# Patient Record
Sex: FEMALE | Race: WHITE | NOT HISPANIC OR LATINO | Employment: FULL TIME | URBAN - NONMETROPOLITAN AREA
[De-identification: names, ages, dates, MRNs, and addresses within clinical notes are randomized per-mention and may not be internally consistent; named-entity substitution may affect disease eponyms.]

---

## 2021-06-23 ENCOUNTER — INITIAL PRENATAL (OUTPATIENT)
Dept: OBSTETRICS AND GYNECOLOGY | Facility: CLINIC | Age: 26
End: 2021-06-23

## 2021-06-23 ENCOUNTER — TELEPHONE (OUTPATIENT)
Dept: OBSTETRICS AND GYNECOLOGY | Facility: CLINIC | Age: 26
End: 2021-06-23

## 2021-06-23 ENCOUNTER — LAB (OUTPATIENT)
Dept: LAB | Facility: HOSPITAL | Age: 26
End: 2021-06-23

## 2021-06-23 VITALS
HEIGHT: 66 IN | DIASTOLIC BLOOD PRESSURE: 62 MMHG | BODY MASS INDEX: 26.68 KG/M2 | WEIGHT: 166 LBS | SYSTOLIC BLOOD PRESSURE: 108 MMHG

## 2021-06-23 DIAGNOSIS — O36.80X0 ENCOUNTER TO DETERMINE FETAL VIABILITY OF PREGNANCY, SINGLE OR UNSPECIFIED FETUS: ICD-10-CM

## 2021-06-23 DIAGNOSIS — Z34.00 SUPERVISION OF NORMAL FIRST PREGNANCY, ANTEPARTUM: Primary | ICD-10-CM

## 2021-06-23 DIAGNOSIS — Z32.01 POSITIVE BLOOD PREGNANCY TEST: ICD-10-CM

## 2021-06-23 DIAGNOSIS — F12.90 MARIJUANA SMOKER: ICD-10-CM

## 2021-06-23 DIAGNOSIS — Z36.87 UNSURE OF LMP (LAST MENSTRUAL PERIOD) AS REASON FOR ULTRASOUND SCAN: ICD-10-CM

## 2021-06-23 LAB
ABO GROUP BLD: NORMAL
AMPHET+METHAMPHET UR QL: NEGATIVE
AMPHETAMINES UR QL: NEGATIVE
BARBITURATES UR QL SCN: NEGATIVE
BASOPHILS # BLD AUTO: 0.06 10*3/MM3 (ref 0–0.2)
BASOPHILS NFR BLD AUTO: 0.5 % (ref 0–1.5)
BENZODIAZ UR QL SCN: NEGATIVE
BILIRUB UR QL STRIP: NEGATIVE
BLD GP AB SCN SERPL QL: NEGATIVE
BUPRENORPHINE SERPL-MCNC: NEGATIVE NG/ML
CANNABINOIDS SERPL QL: POSITIVE
CLARITY UR: ABNORMAL
COCAINE UR QL: NEGATIVE
COLOR UR: YELLOW
DEPRECATED RDW RBC AUTO: 40.1 FL (ref 37–54)
EOSINOPHIL # BLD AUTO: 0.13 10*3/MM3 (ref 0–0.4)
EOSINOPHIL NFR BLD AUTO: 1.1 % (ref 0.3–6.2)
ERYTHROCYTE [DISTWIDTH] IN BLOOD BY AUTOMATED COUNT: 12.8 % (ref 12.3–15.4)
GLUCOSE UR STRIP-MCNC: NEGATIVE MG/DL
HBV SURFACE AG SERPL QL IA: NORMAL
HCG INTACT+B SERPL-ACNC: NORMAL MIU/ML
HCT VFR BLD AUTO: 39.4 % (ref 34–46.6)
HCV AB SER DONR QL: NORMAL
HGB BLD-MCNC: 13.5 G/DL (ref 12–15.9)
HGB UR QL STRIP.AUTO: NEGATIVE
HIV1+2 AB SER QL: NORMAL
IMM GRANULOCYTES # BLD AUTO: 0.04 10*3/MM3 (ref 0–0.05)
IMM GRANULOCYTES NFR BLD AUTO: 0.3 % (ref 0–0.5)
KETONES UR QL STRIP: NEGATIVE
LEUKOCYTE ESTERASE UR QL STRIP.AUTO: NEGATIVE
LYMPHOCYTES # BLD AUTO: 3.5 10*3/MM3 (ref 0.7–3.1)
LYMPHOCYTES NFR BLD AUTO: 29 % (ref 19.6–45.3)
Lab: NORMAL
MCH RBC QN AUTO: 29.7 PG (ref 26.6–33)
MCHC RBC AUTO-ENTMCNC: 34.3 G/DL (ref 31.5–35.7)
MCV RBC AUTO: 86.8 FL (ref 79–97)
METHADONE UR QL SCN: NEGATIVE
MONOCYTES # BLD AUTO: 0.71 10*3/MM3 (ref 0.1–0.9)
MONOCYTES NFR BLD AUTO: 5.9 % (ref 5–12)
NEUTROPHILS NFR BLD AUTO: 63.2 % (ref 42.7–76)
NEUTROPHILS NFR BLD AUTO: 7.64 10*3/MM3 (ref 1.7–7)
NITRITE UR QL STRIP: NEGATIVE
NRBC BLD AUTO-RTO: 0 /100 WBC (ref 0–0.2)
OPIATES UR QL: NEGATIVE
OXYCODONE UR QL SCN: NEGATIVE
PCP UR QL SCN: NEGATIVE
PH UR STRIP.AUTO: 7 [PH] (ref 5–8)
PLATELET # BLD AUTO: 323 10*3/MM3 (ref 140–450)
PMV BLD AUTO: 10.2 FL (ref 6–12)
PROPOXYPH UR QL: NEGATIVE
PROT UR QL STRIP: NEGATIVE
RBC # BLD AUTO: 4.54 10*6/MM3 (ref 3.77–5.28)
RH BLD: POSITIVE
SP GR UR STRIP: 1.03 (ref 1–1.03)
TRICYCLICS UR QL SCN: NEGATIVE
UROBILINOGEN UR QL STRIP: ABNORMAL
WBC # BLD AUTO: 12.08 10*3/MM3 (ref 3.4–10.8)

## 2021-06-23 PROCEDURE — 87661 TRICHOMONAS VAGINALIS AMPLIF: CPT | Performed by: NURSE PRACTITIONER

## 2021-06-23 PROCEDURE — 80306 DRUG TEST PRSMV INSTRMNT: CPT | Performed by: NURSE PRACTITIONER

## 2021-06-23 PROCEDURE — 87086 URINE CULTURE/COLONY COUNT: CPT | Performed by: NURSE PRACTITIONER

## 2021-06-23 PROCEDURE — 36415 COLL VENOUS BLD VENIPUNCTURE: CPT

## 2021-06-23 PROCEDURE — 81003 URINALYSIS AUTO W/O SCOPE: CPT | Performed by: NURSE PRACTITIONER

## 2021-06-23 PROCEDURE — 87591 N.GONORRHOEAE DNA AMP PROB: CPT | Performed by: NURSE PRACTITIONER

## 2021-06-23 PROCEDURE — G0480 DRUG TEST DEF 1-7 CLASSES: HCPCS | Performed by: NURSE PRACTITIONER

## 2021-06-23 PROCEDURE — 80081 OBSTETRIC PANEL INC HIV TSTG: CPT | Performed by: NURSE PRACTITIONER

## 2021-06-23 PROCEDURE — 86803 HEPATITIS C AB TEST: CPT | Performed by: NURSE PRACTITIONER

## 2021-06-23 PROCEDURE — 87491 CHLMYD TRACH DNA AMP PROBE: CPT | Performed by: NURSE PRACTITIONER

## 2021-06-23 PROCEDURE — 84702 CHORIONIC GONADOTROPIN TEST: CPT | Performed by: NURSE PRACTITIONER

## 2021-06-23 RX ORDER — DIPHENHYDRAMINE HYDROCHLORIDE 25 MG/1
25 CAPSULE ORAL 2 TIMES DAILY
Qty: 60 TABLET | Refills: 3 | Status: SHIPPED | OUTPATIENT
Start: 2021-06-23 | End: 2021-07-20

## 2021-06-23 RX ORDER — ONDANSETRON 4 MG/1
4 TABLET, FILM COATED ORAL DAILY PRN
Qty: 30 TABLET | Refills: 1 | Status: SHIPPED | OUTPATIENT
Start: 2021-06-23 | End: 2022-06-23

## 2021-06-23 RX ORDER — PROMETHAZINE HYDROCHLORIDE 25 MG/1
TABLET ORAL
COMMUNITY
Start: 2021-06-17 | End: 2021-07-20

## 2021-06-23 NOTE — PROGRESS NOTES
I spent approximately 45 minutes with the patient acquiring the health and history intake and discussing topics related to healthy lifestyle. She is accompanied by her wesley. She had a positive serum HCG at Urgent Care.  Her LMP is approximately 5/10/21. This is her 1st  pregnancy. Her marie' has a daughter.   She has a history of asthma. She also has a history of depression. She says she does not have any problems with it now. She filled out the depression screening questionnaire and scored 2. She quit smoking when she found out she was pregnant. She drank occasionally prior to pregnancy. She does still smoke marijuana. She says it helps with her appetite since she is having nausea. She says she plans to quit smoking. I told her we do recommend that she quit and that she not be around secondhand smoke of any kind.   A newob bag is given today.  The 1st trimester teaching was done with the patient. We discussed a healthy diet and exercise and what is recommended. She plans to  prenatal gummies. I also discussed Listeriosis and Toxoplasmosis and what fish to avoid due to high mercury levels. I informed patient not to be in hot tubs, saunas, or tanning beds. We discussed that spotting may occur after intercourse which is common, but if heavy bleeding like a period occurs to call the [facility] or hospital if clinic is closed.  I encouraged her to make an appointment with the dentist if she has not had a dental exam and cleaning in the last 6 months. She is unsure if she wants  to breastfeed or formula feed.  I gave her pamphlet on breastfeeding classes and the breastfeeding mothers support group. These services are provided by Estephania Alvarez, Lactation Consultant. She filled out the health department referral form. She is interested in WIC. I also gave her a HANDS pamphlet.  I encouraged the patient to get the TDAP vaccine in the 3rd trimester.  I discussed with the patient that a pediatrician needs to be  chosen prior to delivery for the infant to have an appointment scheduled before leaving the hospital.  I discussed lab tests will be done today. She says her last pap smear was in 2019 in [place].  All questions were answered at this time. She is scheduled for an ultrasound and to see Dr. Paris on 7/20/21.

## 2021-06-23 NOTE — TELEPHONE ENCOUNTER
The patient had asked me to call her with her beta HCG quant level. I called patient and gave her the results.

## 2021-06-24 LAB
BACTERIA SPEC AEROBE CULT: NO GROWTH
C TRACH RRNA CVX QL NAA+PROBE: NEGATIVE
N GONORRHOEA RRNA SPEC QL NAA+PROBE: NEGATIVE
RPR SER QL: NORMAL
RUBV IGG SERPL IA-ACNC: 1.18 INDEX
TRICHOMONAS VAGINALIS PCR: NEGATIVE

## 2021-07-09 LAB — CANNABINOIDS UR QL CFM: POSITIVE

## 2021-07-20 ENCOUNTER — INITIAL PRENATAL (OUTPATIENT)
Dept: OBSTETRICS AND GYNECOLOGY | Facility: CLINIC | Age: 26
End: 2021-07-20

## 2021-07-20 VITALS — WEIGHT: 166 LBS | SYSTOLIC BLOOD PRESSURE: 104 MMHG | BODY MASS INDEX: 26.79 KG/M2 | DIASTOLIC BLOOD PRESSURE: 60 MMHG

## 2021-07-20 DIAGNOSIS — Z34.00 SUPERVISION OF NORMAL FIRST PREGNANCY, ANTEPARTUM: ICD-10-CM

## 2021-07-20 DIAGNOSIS — Z3A.10 10 WEEKS GESTATION OF PREGNANCY: Primary | ICD-10-CM

## 2021-07-20 PROCEDURE — 99203 OFFICE O/P NEW LOW 30 MIN: CPT | Performed by: FAMILY MEDICINE

## 2021-07-20 RX ORDER — PRENATAL VIT/IRON FUM/FOLIC AC 27MG-0.8MG
TABLET ORAL DAILY
COMMUNITY

## 2021-07-20 NOTE — PROGRESS NOTES
[facility]  Obstetrics Visit    CHIEF COMPLAINT:  New prenatal visit    HISTORY OF PRESENT ILLNESS:  Jayashree Roberts is a 25 y.o. y/o  at 10w1d by approximate LMP (Patient's last menstrual period was 05/10/2021 (within days).) c/w 1TUS (21 10w5d) This was an unplanned pregnancy and the patient is supported by FOB: Sulaiman. Reports mild nausea controlled with PRN zofran.  She denies any vaginal bleeding. She has started taking a prenatal vitamin.  Undecided about feeding intentions.    PRENATAL RISK FACTORS   Problems (from 21 to present)     No problems associated with this episode.          DATING CRITERIA:  Patient's last menstrual period was 05/10/2021 (within days). -- Estimated Date of Delivery: 22 by LMP c/w 1TUS (21 at 10w5d)    OBSTETRIC HISTORY:  OB History    Para Term  AB Living   1             SAB TAB Ectopic Molar Multiple Live Births                    # Outcome Date GA Lbr Dev/2nd Weight Sex Delivery Anes PTL Lv   1 Current              GYN HISTORY:  Denies h/o sexually transmitted infections/pelvic inflammatory disease  Denies h/o abnormal pap smears, last pap was   Last Completed Pap Smear     This patient has no relevant Health Maintenance data.        Denies h/o gynecologic surgeries, including biopsies of the cervix    PAST MEDICAL HISTORY:  Past Medical History:   Diagnosis Date   • Asthma    • History of depression    • Marijuana smoker    • Substance abuse (CMS/[facility])    • Varicella      PAST SURGICAL HISTORY:  Past Surgical History:   Procedure Laterality Date   • ADENOIDECTOMY     • TONSILLECTOMY       FAMILY HISTORY:  Family History   Problem Relation Age of Onset   • Thyroid disease Father    • Diabetes Mother    • Thyroid disease Sister    • Diabetes Maternal Grandmother    • No Known Problems Paternal Grandmother      SOCIAL HISTORY:  Social History     Socioeconomic History   • Marital status: Single     Spouse name: Not on file   •  Number of children: Not on file   • Years of education: Not on file   • Highest education level: Not on file   Tobacco Use   • Smoking status: Former Smoker   • Smokeless tobacco: Never Used   Substance and Sexual Activity   • Alcohol use: Not Currently   • Drug use: Yes     Types: Marijuana   • Sexual activity: Yes     Partners: Male     Comment: last pap smear 2019 in  [place]     GENETIC SCREENING:  Age >34 yo as of LEONARDO: no  Thalassemia: no  NTD: no  CHD: no  Down Syndrome/MR/Fragile X/Autism: no  [employer] with Ciro-Sachs, [place], familial dysautonomia: no  Sickle cell disease or trait: no  Hemophilia: no  Muscular dystrophy: no  Cystic fibrosis: no  Khadar's chorea: no  Birth defects: no  Genetic/chromosomal disorders: no    INFECTION HISTORY:  TB exposure: no  HSV: no  Illness since LMP: no  Prior GBS infected child: n/a  STIs: no    ALLERGIES:  Allergies   Allergen Reactions   • Azithromycin Rash   • Penicillins Rash     MEDICATIONS:  Prior to Admission medications    Medication Sig Start Date End Date Taking? Authorizing Provider   Prenatal Vit-Fe Fumarate-FA (prenatal vitamin 27-0.8) 27-0.8 MG tablet tablet Take  by mouth Daily.   Yes Jaylyn Rouse MD   ondansetron (Zofran) 4 MG tablet Take 1 tablet by mouth Daily As Needed for Nausea or Vomiting. 6/23/21 6/23/22  Lynette Chand APRN   promethazine (PHENERGAN) 25 MG tablet TAKE 1/2 TO 1 TABLET BY MOUTH EVERY 6 HOURS AS NEEDED FOR NAUSEA 6/17/21 7/20/21  ProviderJaylyn MD   vitamin B-6 (PYRIDOXINE) 25 MG tablet Take 1 tablet by mouth 2 (two) times a day. 6/23/21 7/20/21  Lynette Chand APRN       REVIEW OF SYSTEMS  GEN: no fever or chills  CVS: no chest pain or palpitations  RESP: no cough, no shortness of breath  BREAST: no masses or nipple discharge  GI: + nausea, no vomiting, no diarrhea, constipation or abdominal pain  : no dysuria, no vaginal discharge, no vaginal bleeding  CNS: no dizziness or  lightheadedness  DERM: no rash, no lesions  MUSC: no myalgias, no gait disturbance  Psych: no depression or anxiety    PHYSICAL EXAM:   /60   Wt 75.3 kg (166 lb)   LMP 05/10/2021 (Within Days)   BMI 26.79 kg/m²   General: Alert, healthy, no distress, well nourished and well developed.  Neurologic: Alert, oriented to person, place, and time.  Gait normal.  Cranial nerves II-XII grossly intact.  HEENT: Normocephalic, atraumatic.  Extraocular muscles intact, pupils equal and reactive x2.    Teeth: Normal hygiene.  Neck: Supple, trachea midline.  Lungs: Clear to auscultation bilaterally, normal effort. No wheezes/rhonchi/rales.  Heart: Regular rate and rhythm.  No murmer, rub or gallop.  Abdomen: Soft, non-tender, non-distended  Skin: No rash, no lesions.  Extremities: No cyanosis, clubbing or edema.  PELVIC EXAM:  DEFERRED    IMPRESSION:  Jayashree Roberts is a 25 y.o.  at 10w1d for a new prenatal visit.    PLAN:  1.  IUP at 10w1d  - Prenatal labs reviewed including Type and Screen, CBC, Rubella, RPR, Hepatitis B and C and HIV, Gonorrhea/Chlamydia cultures, UA and urine culture  - Dating/viability ultrasound obtained and reviewed  - Pap smear reviewed  - Continue prenatal vitamins  - Weight gain counseling performed.   - BMI <18.5: Recommend 28-40 lb weight gain   - BMI 18.5-24.9: 25-35 lb   - BMI 25-29.9: 15-25 lb   - BMI >30: 11-20 lb    - Return to clinic in 4 weeks for return prenatal visit    Signature  Shy Paris MD  [facility]  [address] [place] [address]  Office: [phone]    This document has been electronically signed by Shy Paris MD on 2021 14:19 CDT

## 2021-08-17 ENCOUNTER — ROUTINE PRENATAL (OUTPATIENT)
Dept: OBSTETRICS AND GYNECOLOGY | Facility: CLINIC | Age: 26
End: 2021-08-17

## 2021-08-17 VITALS — WEIGHT: 170.6 LBS | DIASTOLIC BLOOD PRESSURE: 58 MMHG | BODY MASS INDEX: 27.54 KG/M2 | SYSTOLIC BLOOD PRESSURE: 112 MMHG

## 2021-08-17 DIAGNOSIS — Z34.00 SUPERVISION OF NORMAL FIRST PREGNANCY, ANTEPARTUM: ICD-10-CM

## 2021-08-17 DIAGNOSIS — Z3A.14 14 WEEKS GESTATION OF PREGNANCY: Primary | ICD-10-CM

## 2021-08-17 DIAGNOSIS — Z36.89 ENCOUNTER FOR FETAL ANATOMIC SURVEY: ICD-10-CM

## 2021-08-17 PROCEDURE — 99212 OFFICE O/P EST SF 10 MIN: CPT | Performed by: FAMILY MEDICINE

## 2021-08-17 NOTE — PROGRESS NOTES
CC: Prenatal visit    Jayashree Roberts is a 26 y.o.  at 14w1d.  Doing well.  No complaints.  Denies contractions, LOF, or VB.  Has not yet felt FM.    /58   Wt 77.4 kg (170 lb 9.6 oz)   LMP 05/10/2021 (Within Days)   BMI 27.54 kg/m²   SVE: deferred     Fetal Heart Rate: 157     Problems (from 21 to present)     Problem Noted Resolved    Supervision of normal first pregnancy, antepartum 2021 by Shy Paris MD No    Overview Signed 2021  2:23 PM by Shy Paris MD     A POS/ Rubella immune/ GBS @ 36wks  Dating: LMP  Tdap: 28wks  Anatomy: 20wks  1h Glucola: 28wks  Lab Results   Component Value Date    HGB 13.5 2021    HCT 39.4 2021     2021     Feeding Method: undecided  BC plan: ?                 A/P: Jayashree Roberts is a 26 y.o.  at 14w1d.  - RTC in 6 weeks with anatomy scan  - cramping precautions given     Diagnosis Plan   1. 14 weeks gestation of pregnancy     2. Supervision of normal first pregnancy, antepartum     3. Encounter for fetal anatomic survey  MFM OB US MAD       Signature  Shy Paris MD  [facility]  [address] [place] [address]  Office: [phone]      This document has been electronically signed by Shy Paris MD on 2021 13:42 CDT

## 2021-09-28 ENCOUNTER — ROUTINE PRENATAL (OUTPATIENT)
Dept: OBSTETRICS AND GYNECOLOGY | Facility: CLINIC | Age: 26
End: 2021-09-28

## 2021-09-28 ENCOUNTER — LAB (OUTPATIENT)
Dept: LAB | Facility: HOSPITAL | Age: 26
End: 2021-09-28

## 2021-09-28 VITALS — DIASTOLIC BLOOD PRESSURE: 70 MMHG | BODY MASS INDEX: 28.57 KG/M2 | WEIGHT: 177 LBS | SYSTOLIC BLOOD PRESSURE: 118 MMHG

## 2021-09-28 DIAGNOSIS — R22.43: ICD-10-CM

## 2021-09-28 DIAGNOSIS — Z3A.20 20 WEEKS GESTATION OF PREGNANCY: ICD-10-CM

## 2021-09-28 DIAGNOSIS — Z36.2 ENCOUNTER FOR OTHER ANTENATAL SCREENING FOLLOW-UP: ICD-10-CM

## 2021-09-28 DIAGNOSIS — Z34.02 ENCOUNTER FOR SUPERVISION OF NORMAL FIRST PREGNANCY IN SECOND TRIMESTER: Primary | ICD-10-CM

## 2021-09-28 LAB
D-DIMER, QUANTITATIVE (MAD,POW, STR): 337 NG/ML (FEU) (ref 0–470)
DEPRECATED RDW RBC AUTO: 43.6 FL (ref 37–54)
ERYTHROCYTE [DISTWIDTH] IN BLOOD BY AUTOMATED COUNT: 13.2 % (ref 12.3–15.4)
HCT VFR BLD AUTO: 37.2 % (ref 34–46.6)
HGB BLD-MCNC: 12.3 G/DL (ref 12–15.9)
MCH RBC QN AUTO: 29.9 PG (ref 26.6–33)
MCHC RBC AUTO-ENTMCNC: 33.1 G/DL (ref 31.5–35.7)
MCV RBC AUTO: 90.5 FL (ref 79–97)
PLATELET # BLD AUTO: 363 10*3/MM3 (ref 140–450)
PMV BLD AUTO: 10.2 FL (ref 6–12)
RBC # BLD AUTO: 4.11 10*6/MM3 (ref 3.77–5.28)
WBC # BLD AUTO: 13.88 10*3/MM3 (ref 3.4–10.8)

## 2021-09-28 PROCEDURE — 99214 OFFICE O/P EST MOD 30 MIN: CPT | Performed by: NURSE PRACTITIONER

## 2021-09-28 PROCEDURE — 36415 COLL VENOUS BLD VENIPUNCTURE: CPT

## 2021-09-28 PROCEDURE — 85379 FIBRIN DEGRADATION QUANT: CPT

## 2021-09-28 PROCEDURE — 85027 COMPLETE CBC AUTOMATED: CPT

## 2021-09-28 NOTE — PROGRESS NOTES
"CC: Prenatal visit    Jayashree Roberts is a 26 y.o.  at 20w1d.  Doing well.  Denies N/V, dysuria, abnormal vaginal d/c, headaches, heartburn, constipation, cramping, LOF, or VB.  Reports occasional FM. Has c/o \"knots\" on the front of both lower legs that are causing discoloration and tenderness.     /70   Wt 80.3 kg (177 lb)   LMP 05/10/2021 (Within Days)   BMI 28.57 kg/m²   SVE: NA  Fundal Height (cm): 20 cm  Fetal Heart Rate: 152    Preliminary anatomy u/s report reviewed with pt. Subopts noted. Anatomy seen today was noted to appear normal at this time. Anterior placenta w/o previa, with normal insertion of a 3VC. CL- 3.57cm.     Problems (from 21 to present)     Problem Noted Resolved    Encounter for supervision of normal first pregnancy in second trimester 2021 by Shy Paris MD No    Overview Signed 2021  2:23 PM by Shy Paris MD     A POS/ Rubella immune/ GBS @ 36wks  Dating: LMP  Tdap: 28wks  Anatomy: 20wks  1h Glucola: 28wks  Lab Results   Component Value Date    HGB 13.5 2021    HCT 39.4 2021     2021     Feeding Method: undecided  BC plan: ?                 A/P: Jayashree Roberts is a 26 y.o.  at 20w1d.  - RTC in 4 weeks  - Reviewed COVID-19 visitation policy  - Reviewed COVID-19 precautions     Diagnosis Plan   1. Encounter for supervision of normal first pregnancy in second trimester  US ob follow up transabdominal approach   2. 20 weeks gestation of pregnancy     3. Subcutaneous mass of both lower legs  CBC (No Diff)    D-dimer, Quantitative   4. Encounter for other  screening follow-up  US ob follow up transabdominal approach     MAUREEN Cuevas  2021  13:39 CDT    "

## 2021-10-13 ENCOUNTER — LAB (OUTPATIENT)
Dept: LAB | Facility: HOSPITAL | Age: 26
End: 2021-10-13

## 2021-10-13 DIAGNOSIS — R30.0 DYSURIA: ICD-10-CM

## 2021-10-13 DIAGNOSIS — R30.0 DYSURIA: Primary | ICD-10-CM

## 2021-10-13 LAB
BILIRUB UR QL STRIP: NEGATIVE
CLARITY UR: CLEAR
COLOR UR: YELLOW
GLUCOSE UR STRIP-MCNC: NEGATIVE MG/DL
HGB UR QL STRIP.AUTO: ABNORMAL
KETONES UR QL STRIP: NEGATIVE
LEUKOCYTE ESTERASE UR QL STRIP.AUTO: ABNORMAL
NITRITE UR QL STRIP: NEGATIVE
PH UR STRIP.AUTO: 6.5 [PH] (ref 5–8)
PROT UR QL STRIP: NEGATIVE
SP GR UR STRIP: 1.01 (ref 1–1.03)
UROBILINOGEN UR QL STRIP: ABNORMAL

## 2021-10-13 PROCEDURE — 81003 URINALYSIS AUTO W/O SCOPE: CPT

## 2021-10-13 PROCEDURE — 87086 URINE CULTURE/COLONY COUNT: CPT | Performed by: NURSE PRACTITIONER

## 2021-10-14 DIAGNOSIS — R30.0 DYSURIA: Primary | ICD-10-CM

## 2021-10-14 DIAGNOSIS — R31.9 HEMATURIA, UNSPECIFIED TYPE: ICD-10-CM

## 2021-10-14 LAB — BACTERIA SPEC AEROBE CULT: NORMAL

## 2021-10-14 RX ORDER — NITROFURANTOIN 25; 75 MG/1; MG/1
100 CAPSULE ORAL 2 TIMES DAILY
Qty: 14 CAPSULE | Refills: 0 | Status: SHIPPED | OUTPATIENT
Start: 2021-10-14 | End: 2021-10-21

## 2021-10-26 DIAGNOSIS — Z34.02 ENCOUNTER FOR SUPERVISION OF NORMAL FIRST PREGNANCY IN SECOND TRIMESTER: ICD-10-CM

## 2021-10-26 DIAGNOSIS — Z36.2 ENCOUNTER FOR OTHER ANTENATAL SCREENING FOLLOW-UP: ICD-10-CM

## 2021-10-27 ENCOUNTER — ROUTINE PRENATAL (OUTPATIENT)
Dept: OBSTETRICS AND GYNECOLOGY | Facility: CLINIC | Age: 26
End: 2021-10-27

## 2021-10-27 VITALS — BODY MASS INDEX: 29.96 KG/M2 | WEIGHT: 185.6 LBS | SYSTOLIC BLOOD PRESSURE: 112 MMHG | DIASTOLIC BLOOD PRESSURE: 70 MMHG

## 2021-10-27 DIAGNOSIS — Z34.02 ENCOUNTER FOR SUPERVISION OF NORMAL FIRST PREGNANCY IN SECOND TRIMESTER: Primary | ICD-10-CM

## 2021-10-27 DIAGNOSIS — M79.662 PAIN IN BOTH LOWER LEGS: ICD-10-CM

## 2021-10-27 DIAGNOSIS — F12.90 MARIJUANA USE: ICD-10-CM

## 2021-10-27 DIAGNOSIS — M79.661 PAIN IN BOTH LOWER LEGS: ICD-10-CM

## 2021-10-27 PROCEDURE — 99214 OFFICE O/P EST MOD 30 MIN: CPT | Performed by: STUDENT IN AN ORGANIZED HEALTH CARE EDUCATION/TRAINING PROGRAM

## 2021-10-27 NOTE — PROGRESS NOTES
"CC: Prenatal visit    Jayashree Roberts is a 26 y.o.  at 24w2d.  Doing well.  Denies contractions, LOF, or VB.  Reports good FM. Varicose veins vs. Other painful areas on legs are improving. Unsure contraception but definitely interested.    /70   Wt 84.2 kg (185 lb 9.6 oz)   LMP 05/10/2021 (Within Days)   BMI 29.96 kg/m²   SVE: deferred  Fundal Height (cm): 24 cm  Fetal Heart Rate: 145  21 US with  g, recommended repeat growth and additional anatomy due to limited views in 4 wks  10[facility]/21 [facility] at [facility]: anterior placenta, transverse lie, BPD 78%ile, HC 69%ile, AC 34%ile, FL 74%ile    A/P: Jayashree Roberts is a 26 y.o.  at 24w2d.  - RTC in 4 weeks  - 1 hr GCT, CBC, Tdap next visit  - Reviewed COVID-19 visitation policy  - Reviewed COVID-19 precautions    Problem List Items Addressed This Visit        Advance Directives and General Issues    Marijuana use    Overview     +THC IOB, denies current use            Gravid and     Encounter for supervision of normal first pregnancy in second trimester - Primary    Overview     A POS/ Rubella immune/ GBS @ 36wks  Dating: LMP  Tdap: 28wks  Anatomy: 20wks  1h Glucola: 28wks  Lab Results   Component Value Date    HGB 13.5 2021    HCT 39.4 2021     2021     Feeding Method: undecided, considering both  BC plan: ? [email or web address] info 10/27              Musculoskeletal and Injuries    Pain in both lower legs    Overview     Previously \"knots\" in legs, improved now, possibly varicose veins? No fevers/chills or other signs illness/rash.                  Diagnosis Plan   1. Encounter for supervision of normal first pregnancy in second trimester     2. Marijuana use     3. Pain in both lower legs       Lori Ambriz DO  10/29/2021  23:10 CDT    "

## 2021-10-29 PROBLEM — M79.662 PAIN IN BOTH LOWER LEGS: Status: ACTIVE | Noted: 2021-10-29

## 2021-10-29 PROBLEM — F12.90 MARIJUANA USE: Status: ACTIVE | Noted: 2021-10-29

## 2021-10-29 PROBLEM — M79.661 PAIN IN BOTH LOWER LEGS: Status: ACTIVE | Noted: 2021-10-29

## 2021-10-30 ENCOUNTER — HOSPITAL ENCOUNTER (EMERGENCY)
Facility: HOSPITAL | Age: 26
Discharge: HOME | End: 2021-10-30
Attending: STUDENT IN AN ORGANIZED HEALTH CARE EDUCATION/TRAINING PROGRAM | Admitting: FAMILY MEDICINE

## 2021-10-30 VITALS
TEMPERATURE: 97.8 F | HEART RATE: 81 BPM | RESPIRATION RATE: 17 BRPM | WEIGHT: 186.8 LBS | BODY MASS INDEX: 30.02 KG/M2 | DIASTOLIC BLOOD PRESSURE: 74 MMHG | OXYGEN SATURATION: 100 % | HEIGHT: 66 IN | SYSTOLIC BLOOD PRESSURE: 116 MMHG

## 2021-10-30 DIAGNOSIS — O23.12 ACUTE CYSTITIS DURING PREGNANCY IN SECOND TRIMESTER: Primary | ICD-10-CM

## 2021-10-30 LAB
BACTERIA UR QL AUTO: ABNORMAL /HPF
BILIRUB UR QL STRIP: NEGATIVE
CLARITY UR: ABNORMAL
COLOR UR: YELLOW
GLUCOSE UR STRIP-MCNC: NEGATIVE MG/DL
HGB UR QL STRIP.AUTO: ABNORMAL
HYALINE CASTS UR QL AUTO: ABNORMAL /LPF
KETONES UR QL STRIP: NEGATIVE
LEUKOCYTE ESTERASE UR QL STRIP.AUTO: ABNORMAL
NITRITE UR QL STRIP: NEGATIVE
PH UR STRIP.AUTO: 7.5 [PH] (ref 5–9)
PROT UR QL STRIP: NEGATIVE
RBC # UR: ABNORMAL /HPF
REF LAB TEST METHOD: ABNORMAL
SP GR UR STRIP: 1.01 (ref 1–1.03)
SQUAMOUS #/AREA URNS HPF: ABNORMAL /HPF
UROBILINOGEN UR QL STRIP: ABNORMAL
WBC UR QL AUTO: ABNORMAL /HPF

## 2021-10-30 PROCEDURE — 99283 EMERGENCY DEPT VISIT LOW MDM: CPT

## 2021-10-30 PROCEDURE — 81001 URINALYSIS AUTO W/SCOPE: CPT | Performed by: STUDENT IN AN ORGANIZED HEALTH CARE EDUCATION/TRAINING PROGRAM

## 2021-10-30 RX ORDER — CEPHALEXIN 500 MG/1
500 CAPSULE ORAL 2 TIMES DAILY
Qty: 10 CAPSULE | Refills: 0 | Status: SHIPPED | OUTPATIENT
Start: 2021-10-30 | End: 2021-11-04

## 2021-11-02 ENCOUNTER — LAB (OUTPATIENT)
Dept: LAB | Facility: HOSPITAL | Age: 26
End: 2021-11-02

## 2021-11-02 ENCOUNTER — ROUTINE PRENATAL (OUTPATIENT)
Dept: OBSTETRICS AND GYNECOLOGY | Facility: CLINIC | Age: 26
End: 2021-11-02

## 2021-11-02 VITALS — BODY MASS INDEX: 30.31 KG/M2 | DIASTOLIC BLOOD PRESSURE: 88 MMHG | SYSTOLIC BLOOD PRESSURE: 132 MMHG | WEIGHT: 187.8 LBS

## 2021-11-02 DIAGNOSIS — Z34.02 ENCOUNTER FOR SUPERVISION OF NORMAL FIRST PREGNANCY IN SECOND TRIMESTER: ICD-10-CM

## 2021-11-02 DIAGNOSIS — R30.0 DYSURIA DURING PREGNANCY IN SECOND TRIMESTER: ICD-10-CM

## 2021-11-02 DIAGNOSIS — O26.892 DYSURIA DURING PREGNANCY IN SECOND TRIMESTER: ICD-10-CM

## 2021-11-02 DIAGNOSIS — Z3A.25 25 WEEKS GESTATION OF PREGNANCY: Primary | ICD-10-CM

## 2021-11-02 DIAGNOSIS — F12.90 MARIJUANA USE: ICD-10-CM

## 2021-11-02 LAB
CANDIDA ALBICANS: NEGATIVE
GARDNERELLA VAGINALIS: NEGATIVE
T VAGINALIS DNA VAG QL PROBE+SIG AMP: NEGATIVE

## 2021-11-02 PROCEDURE — 99212 OFFICE O/P EST SF 10 MIN: CPT | Performed by: FAMILY MEDICINE

## 2021-11-02 PROCEDURE — 87510 GARDNER VAG DNA DIR PROBE: CPT | Performed by: FAMILY MEDICINE

## 2021-11-02 PROCEDURE — 87480 CANDIDA DNA DIR PROBE: CPT | Performed by: FAMILY MEDICINE

## 2021-11-02 PROCEDURE — 87660 TRICHOMONAS VAGIN DIR PROBE: CPT | Performed by: FAMILY MEDICINE

## 2021-11-02 NOTE — PROGRESS NOTES
"CC: ER follow up/UTI    Jayashree Roberts is a 26 y.o.  at 25w1d presents for ER follow up for \"UTI\". Was seen in ED over the weekend for hematuria/dysuria.  UA done at that time showed RBCs/+leuk est/6-12 squamous cells/ no bacteria.  She was started on Keflex by the ER physician.  Reports she has been taking the antibiotic & the hematuria has resolved but she continues to have dysuria & rt flank pain. Denies F/C/N/V.     Denies contractions, LOF, or VB.  Reports good FM.    /88   Wt 85.2 kg (187 lb 12.8 oz)   LMP 05/10/2021 (Within Days)   BMI 30.31 kg/m²   SVE: deferred  Fundal Height (cm): 25 cm  Fetal Heart Rate: 152     Problems (from 21 to present)     Problem Noted Resolved    Marijuana use 10/29/2021 by Lori Ambriz DO No    Overview Signed 10/29/2021 11:04 PM by Lori Ambriz DO     +THC IOB, denies current use         Encounter for supervision of normal first pregnancy in second trimester 2021 by Shy Paris MD No    Overview Addendum 10/29/2021 11:05 PM by Lori Ambriz DO     A POS/ Rubella immune/ GBS @ 36wks  Dating: LMP  Tdap: 28wks  Anatomy: 20wks  1h Glucola: 28wks  Lab Results   Component Value Date    HGB 13.5 2021    HCT 39.4 2021     2021     Feeding Method: undecided, considering both  BC plan: ? [email or web address] info 10/27           Previous Version          A/P: Jayashree Roberts is a 26 y.o.  at 25w1d.  - RTC as scheduled in 2 weeks  - check vaginosis panel  - UCx not completed at the ED  - will have her complete antibiotics that were started; encouraged good PO hydration. Continue tylenol PRN pain.       Diagnosis Plan   1. 25 weeks gestation of pregnancy     2. Marijuana use     3. Encounter for supervision of normal first pregnancy in second trimester     4. Dysuria during pregnancy in second trimester  Gardnerella vaginalis, Trichomonas vaginalis, Candida albicans, DNA - Swab, Vagina       Signature  [facility], " MD  [facility]'s [address], [place] [address]  Office: [phone]      This document has been electronically signed by Shy Paris MD on November 2, 2021 14:00 CDT

## 2021-11-03 ENCOUNTER — TELEPHONE (OUTPATIENT)
Dept: OBSTETRICS AND GYNECOLOGY | Facility: HOSPITAL | Age: 26
End: 2021-11-03

## 2021-11-03 NOTE — TELEPHONE ENCOUNTER
Reached out to patient as Maternity Navigator.  I explained the Motherhood Connection program to her and invited her to participate.  She does not wish to do so at this time.  I left her with my contact information and let her know she could contact me at any point if she changed her mind.

## 2021-11-23 ENCOUNTER — LAB (OUTPATIENT)
Dept: LAB | Facility: HOSPITAL | Age: 26
End: 2021-11-23

## 2021-11-23 ENCOUNTER — TELEPHONE (OUTPATIENT)
Dept: OBSTETRICS AND GYNECOLOGY | Facility: CLINIC | Age: 26
End: 2021-11-23

## 2021-11-23 ENCOUNTER — ROUTINE PRENATAL (OUTPATIENT)
Dept: OBSTETRICS AND GYNECOLOGY | Facility: CLINIC | Age: 26
End: 2021-11-23

## 2021-11-23 VITALS — BODY MASS INDEX: 31.18 KG/M2 | SYSTOLIC BLOOD PRESSURE: 112 MMHG | DIASTOLIC BLOOD PRESSURE: 64 MMHG | WEIGHT: 193.2 LBS

## 2021-11-23 DIAGNOSIS — Z34.03 ENCOUNTER FOR SUPERVISION OF NORMAL FIRST PREGNANCY IN THIRD TRIMESTER: ICD-10-CM

## 2021-11-23 DIAGNOSIS — Z28.21 TETANUS, DIPHTHERIA, AND ACELLULAR PERTUSSIS (TDAP) VACCINATION DECLINED: ICD-10-CM

## 2021-11-23 DIAGNOSIS — F12.90 MARIJUANA USE: ICD-10-CM

## 2021-11-23 DIAGNOSIS — R89.9 ABNORMAL LABORATORY TEST: Primary | ICD-10-CM

## 2021-11-23 DIAGNOSIS — Z3A.28 28 WEEKS GESTATION OF PREGNANCY: Primary | ICD-10-CM

## 2021-11-23 DIAGNOSIS — Z28.21 INFLUENZA VACCINATION DECLINED BY PATIENT: ICD-10-CM

## 2021-11-23 DIAGNOSIS — Z13.1 SCREENING FOR DIABETES MELLITUS: ICD-10-CM

## 2021-11-23 LAB
BASOPHILS # BLD AUTO: 0.04 10*3/MM3 (ref 0–0.2)
BASOPHILS NFR BLD AUTO: 0.3 % (ref 0–1.5)
DEPRECATED RDW RBC AUTO: 42.5 FL (ref 37–54)
EOSINOPHIL # BLD AUTO: 0.12 10*3/MM3 (ref 0–0.4)
EOSINOPHIL NFR BLD AUTO: 1 % (ref 0.3–6.2)
ERYTHROCYTE [DISTWIDTH] IN BLOOD BY AUTOMATED COUNT: 13.1 % (ref 12.3–15.4)
GLUCOSE 1H P 100 G GLC PO SERPL-MCNC: 144 MG/DL (ref 65–139)
HCT VFR BLD AUTO: 31.2 % (ref 34–46.6)
HGB BLD-MCNC: 10.6 G/DL (ref 12–15.9)
IMM GRANULOCYTES # BLD AUTO: 0.11 10*3/MM3 (ref 0–0.05)
IMM GRANULOCYTES NFR BLD AUTO: 0.9 % (ref 0–0.5)
LYMPHOCYTES # BLD AUTO: 2 10*3/MM3 (ref 0.7–3.1)
LYMPHOCYTES NFR BLD AUTO: 16.7 % (ref 19.6–45.3)
MCH RBC QN AUTO: 30.2 PG (ref 26.6–33)
MCHC RBC AUTO-ENTMCNC: 34 G/DL (ref 31.5–35.7)
MCV RBC AUTO: 88.9 FL (ref 79–97)
MONOCYTES # BLD AUTO: 0.59 10*3/MM3 (ref 0.1–0.9)
MONOCYTES NFR BLD AUTO: 4.9 % (ref 5–12)
NEUTROPHILS NFR BLD AUTO: 76.2 % (ref 42.7–76)
NEUTROPHILS NFR BLD AUTO: 9.09 10*3/MM3 (ref 1.7–7)
NRBC BLD AUTO-RTO: 0 /100 WBC (ref 0–0.2)
PLATELET # BLD AUTO: 329 10*3/MM3 (ref 140–450)
PMV BLD AUTO: 9.3 FL (ref 6–12)
RBC # BLD AUTO: 3.51 10*6/MM3 (ref 3.77–5.28)
WBC NRBC COR # BLD: 11.95 10*3/MM3 (ref 3.4–10.8)

## 2021-11-23 PROCEDURE — 82950 GLUCOSE TEST: CPT | Performed by: FAMILY MEDICINE

## 2021-11-23 PROCEDURE — 36415 COLL VENOUS BLD VENIPUNCTURE: CPT | Performed by: FAMILY MEDICINE

## 2021-11-23 PROCEDURE — 99213 OFFICE O/P EST LOW 20 MIN: CPT | Performed by: FAMILY MEDICINE

## 2021-11-23 PROCEDURE — 85025 COMPLETE CBC W/AUTO DIFF WBC: CPT | Performed by: FAMILY MEDICINE

## 2021-11-23 NOTE — PROGRESS NOTES
CC: Prenatal visit    Jayashree Roberts is a 26 y.o.  at 28w1d.  Doing well.  Completing glucola today.  Denies contractions, LOF, or VB.  Reports good FM.  Declines TDAP/Flu vaccine because she doesn't like needles.    /64   Wt 87.6 kg (193 lb 3.2 oz)   LMP 05/10/2021 (Within Days)   BMI 31.18 kg/m²   SVE: deferred  Fundal Height (cm): 28 cm  Fetal Heart Rate: 140     Problems (from 21 to present)     Problem Noted Resolved    Marijuana use 10/29/2021 by Lori Ambriz DO No    Overview Signed 10/29/2021 11:04 PM by Lori Ambriz DO     +THC IOB, denies current use         Encounter for supervision of normal first pregnancy in third trimester 2021 by Shy Paris MD No    Overview Addendum 10/29/2021 11:05 PM by Lori Ambriz DO     A POS/ Rubella immune/ GBS @ 36wks  Dating: LMP  Tdap: 28wks  Anatomy: 20wks  1h Glucola: 28wks  Lab Results   Component Value Date    HGB 13.5 2021    HCT 39.4 2021     2021     Feeding Method: undecided, considering both  BC plan: ? [email or web address] info 10/27           Previous Version          A/P: Jayashree Roberts is a 26 y.o.  at 28w1d.  - RTC in 3 week(s)  - TDAP/Flu declined  - FKC reviewed.  PTL precautions given.  Encouraged to drink 3-4 glasses of ice water if she experiences contractions.  If contractions occur regularly (every 5-10 minutes or less) for 1 hour and do not resolve with drinking fluids and/or taking a warm bath, patient advised to come to L&D for evaluation.       Diagnosis Plan   1. 28 weeks gestation of pregnancy     2. Marijuana use     3. Encounter for supervision of normal first pregnancy in third trimester     4. Screening for diabetes mellitus  Glucose, Post 50 Gm Glucola    CBC Auto Differential   5. Tetanus, diphtheria, and acellular pertussis (Tdap) vaccination declined     6. Influenza vaccination declined by patient         Signature  Shy Paris MD  [facility]  [facility]'s [address], [place] [address]  Office: [phone]      This document has been electronically signed by Shy Paris MD on November 23, 2021 09:13 CST

## 2021-11-23 NOTE — TELEPHONE ENCOUNTER
I called this patient and informed her that she did not pass her one hour glucose test.  I put the order in and called the lab for the patient to set up her 3 hour test.

## 2021-11-23 NOTE — TELEPHONE ENCOUNTER
----- Message from Shy Paris MD sent at 11/23/2021  1:06 PM CST -----  Please inform patient her glucose testing was abnormal. She will need to complete 3Hr OGTT ASAP.

## 2021-12-02 ENCOUNTER — LAB (OUTPATIENT)
Dept: LAB | Facility: HOSPITAL | Age: 26
End: 2021-12-02

## 2021-12-02 DIAGNOSIS — R89.9 ABNORMAL LABORATORY TEST: ICD-10-CM

## 2021-12-02 LAB
GLUCOSE 1H P 100 G GLC PO SERPL-MCNC: 110 MG/DL (ref 74–180)
GLUCOSE 2H P 100 G GLC PO SERPL-MCNC: 100 MG/DL (ref 74–155)
GLUCOSE 3H P 100 G GLC PO SERPL-MCNC: 102 MG/DL (ref 74–140)
GLUCOSE P FAST SERPL-MCNC: 85 MG/DL (ref 74–106)

## 2021-12-02 PROCEDURE — 36415 COLL VENOUS BLD VENIPUNCTURE: CPT

## 2021-12-02 PROCEDURE — 82951 GLUCOSE TOLERANCE TEST (GTT): CPT

## 2021-12-02 PROCEDURE — 82952 GTT-ADDED SAMPLES: CPT

## 2021-12-14 ENCOUNTER — ROUTINE PRENATAL (OUTPATIENT)
Dept: OBSTETRICS AND GYNECOLOGY | Facility: CLINIC | Age: 26
End: 2021-12-14

## 2021-12-14 VITALS — WEIGHT: 198.8 LBS | DIASTOLIC BLOOD PRESSURE: 68 MMHG | BODY MASS INDEX: 32.09 KG/M2 | SYSTOLIC BLOOD PRESSURE: 104 MMHG

## 2021-12-14 DIAGNOSIS — Z28.21 INFLUENZA VACCINATION DECLINED BY PATIENT: ICD-10-CM

## 2021-12-14 DIAGNOSIS — F12.90 MARIJUANA USE: ICD-10-CM

## 2021-12-14 DIAGNOSIS — Z3A.31 31 WEEKS GESTATION OF PREGNANCY: Primary | ICD-10-CM

## 2021-12-14 DIAGNOSIS — Z28.21 TETANUS, DIPHTHERIA, AND ACELLULAR PERTUSSIS (TDAP) VACCINATION DECLINED: ICD-10-CM

## 2021-12-14 DIAGNOSIS — Z34.03 ENCOUNTER FOR SUPERVISION OF NORMAL FIRST PREGNANCY IN THIRD TRIMESTER: ICD-10-CM

## 2021-12-14 DIAGNOSIS — O26.849 UTERINE SIZE DATE DISCREPANCY PREGNANCY: Primary | ICD-10-CM

## 2021-12-14 PROCEDURE — 99212 OFFICE O/P EST SF 10 MIN: CPT | Performed by: FAMILY MEDICINE

## 2021-12-14 NOTE — PROGRESS NOTES
CC: Prenatal visit    Jayashree Roberts is a 26 y.o.  at 31w1d.  Doing well.  No complaints.  Denies contractions, LOF, or VB.  Reports good FM. Declines influenza/TDAP.    GS: cephalic, anterior placenta, FHR 151bpm, SHARON 12.6cm, EFW 1918g (73%tile), AC 72%tile.     /68   Wt 90.2 kg (198 lb 12.8 oz)   LMP 05/10/2021 (Within Days)   BMI 32.09 kg/m²   SVE: deferred  Fundal Height (cm): 31 cm  Fetal Heart Rate: 151US     Problems (from 21 to present)     Problem Noted Resolved    Marijuana use 10/29/2021 by Lori Ambriz DO No    Overview Signed 10/29/2021 11:04 PM by Lori Ambriz DO     +THC IOB, denies current use         Encounter for supervision of normal first pregnancy in third trimester 2021 by Shy Paris MD No    Overview Addendum 2021  1:23 PM by Shy Paris MD     A POS/ Rubella immune/ GBS @ 36wks  Dating: LMP  Tdap: declined 21  Flu: declined 21  Anatomy: wnl  1h Glucola: elevated; 3HR pass  Lab Results   Component Value Date    HGB 13.5 2021    HCT 39.4 2021     2021     Feeding Method: undecided, considering both  BC plan: ? [email or web address] info 10/27           Previous Version          A/P: Jayashree Roberts is a 26 y.o.  at 31w1d.  - RTC in 2 week(s)  - FKC reviewed.  PTL precautions given.  Encouraged to drink 3-4 glasses of ice water if she experiences contractions.  If contractions occur regularly (every 5-10 minutes or less) for 1 hour and do not resolve with drinking fluids and/or taking a warm bath, patient advised to come to L&D for evaluation.       Diagnosis Plan   1. 31 weeks gestation of pregnancy     2. Marijuana use     3. Encounter for supervision of normal first pregnancy in third trimester     4. Influenza vaccination declined by patient     5. Tetanus, diphtheria, and acellular pertussis (Tdap) vaccination declined         Signature  Shy Weisenburger, MD  [facility]  [address] [place] [address]  Office: [phone]      This document has been electronically signed by Shy Paris MD on December 14, 2021 10:03 CST

## 2021-12-28 ENCOUNTER — ROUTINE PRENATAL (OUTPATIENT)
Dept: OBSTETRICS AND GYNECOLOGY | Facility: CLINIC | Age: 26
End: 2021-12-28

## 2021-12-28 VITALS — BODY MASS INDEX: 32.77 KG/M2 | WEIGHT: 203 LBS | DIASTOLIC BLOOD PRESSURE: 72 MMHG | SYSTOLIC BLOOD PRESSURE: 118 MMHG

## 2021-12-28 DIAGNOSIS — Z28.21 INFLUENZA VACCINATION DECLINED BY PATIENT: ICD-10-CM

## 2021-12-28 DIAGNOSIS — Z28.21 TETANUS, DIPHTHERIA, AND ACELLULAR PERTUSSIS (TDAP) VACCINATION DECLINED: ICD-10-CM

## 2021-12-28 DIAGNOSIS — F12.90 MARIJUANA USE: ICD-10-CM

## 2021-12-28 DIAGNOSIS — Z34.03 ENCOUNTER FOR SUPERVISION OF NORMAL FIRST PREGNANCY IN THIRD TRIMESTER: ICD-10-CM

## 2021-12-28 DIAGNOSIS — Z3A.33 33 WEEKS GESTATION OF PREGNANCY: Primary | ICD-10-CM

## 2021-12-28 PROCEDURE — 99212 OFFICE O/P EST SF 10 MIN: CPT | Performed by: NURSE PRACTITIONER

## 2021-12-28 NOTE — PROGRESS NOTES
CC: Prenatal visit    Jayashree Roberts is a 26 y.o.  at 33w1d.  Doing well.  No complaints.  Denies contractions, LOF, or VB.  Reports good FM.    /72   Wt 92.1 kg (203 lb)   LMP 05/10/2021 (Within Days)   BMI 32.77 kg/m²              Problems (from 21 to present)     Problem Noted Resolved    Tetanus, diphtheria, and acellular pertussis (Tdap) vaccination declined 2021 by Shy Paris MD No    Influenza vaccination declined by patient 2021 by Shy Paris MD No    Marijuana use 10/29/2021 by Lori Ambriz DO No    Overview Signed 10/29/2021 11:04 PM by Lori Ambriz DO     +THC IOB, denies current use         Encounter for supervision of normal first pregnancy in third trimester 2021 by Shy Paris MD No    Overview Addendum 2021  1:23 PM by Shy Paris MD     A POS/ Rubella immune/ GBS @ 36wks  Dating: LMP  Tdap: declined 21  Flu: declined 21  Anatomy: wnl  1h Glucola: elevated; 3HR pass  Lab Results   Component Value Date    HGB 13.5 2021    HCT 39.4 2021     2021     Feeding Method: undecided, considering both  BC plan: ? [email or web address] info 10/27           Previous Version          A/P: Jayashree Roberts is a 26 y.o.  at 33w1d.  - RTC in 2 weeks for ATILIO appt      Diagnosis Plan   1. 33 weeks gestation of pregnancy     2. Encounter for supervision of normal first pregnancy in third trimester     3. Tetanus, diphtheria, and acellular pertussis (Tdap) vaccination declined     4. Influenza vaccination declined by patient     5. Marijuana use         MAUREEN Brizuela  2021  11:12 CST

## 2022-01-11 ENCOUNTER — ROUTINE PRENATAL (OUTPATIENT)
Dept: OBSTETRICS AND GYNECOLOGY | Facility: CLINIC | Age: 27
End: 2022-01-11

## 2022-01-11 VITALS — WEIGHT: 204.4 LBS | BODY MASS INDEX: 32.99 KG/M2 | SYSTOLIC BLOOD PRESSURE: 104 MMHG | DIASTOLIC BLOOD PRESSURE: 68 MMHG

## 2022-01-11 DIAGNOSIS — F12.90 MARIJUANA USE: ICD-10-CM

## 2022-01-11 DIAGNOSIS — Z34.03 ENCOUNTER FOR SUPERVISION OF NORMAL FIRST PREGNANCY IN THIRD TRIMESTER: ICD-10-CM

## 2022-01-11 DIAGNOSIS — Z28.21 TETANUS, DIPHTHERIA, AND ACELLULAR PERTUSSIS (TDAP) VACCINATION DECLINED: ICD-10-CM

## 2022-01-11 DIAGNOSIS — Z28.21 INFLUENZA VACCINATION DECLINED BY PATIENT: ICD-10-CM

## 2022-01-11 DIAGNOSIS — Z3A.35 35 WEEKS GESTATION OF PREGNANCY: Primary | ICD-10-CM

## 2022-01-11 PROCEDURE — 99213 OFFICE O/P EST LOW 20 MIN: CPT | Performed by: FAMILY MEDICINE

## 2022-01-19 ENCOUNTER — HOSPITAL ENCOUNTER (OUTPATIENT)
Facility: HOSPITAL | Age: 27
Discharge: HOME | End: 2022-01-19
Attending: OBSTETRICS & GYNECOLOGY | Admitting: OBSTETRICS & GYNECOLOGY

## 2022-01-19 ENCOUNTER — HOSPITAL ENCOUNTER (OUTPATIENT)
Facility: HOSPITAL | Age: 27
End: 2022-01-19
Attending: OBSTETRICS & GYNECOLOGY | Admitting: OBSTETRICS & GYNECOLOGY

## 2022-01-19 VITALS
HEART RATE: 117 BPM | RESPIRATION RATE: 16 BRPM | DIASTOLIC BLOOD PRESSURE: 77 MMHG | TEMPERATURE: 98.6 F | SYSTOLIC BLOOD PRESSURE: 121 MMHG | OXYGEN SATURATION: 98 %

## 2022-01-19 LAB
FLUAV SUBTYP SPEC NAA+PROBE: NOT DETECTED
FLUBV RNA ISLT QL NAA+PROBE: NOT DETECTED
SARS-COV-2 RNA PNL SPEC NAA+PROBE: NOT DETECTED

## 2022-01-19 PROCEDURE — 59025 FETAL NON-STRESS TEST: CPT | Performed by: OBSTETRICS & GYNECOLOGY

## 2022-01-19 PROCEDURE — 59025 FETAL NON-STRESS TEST: CPT

## 2022-01-19 PROCEDURE — 87636 SARSCOV2 & INF A&B AMP PRB: CPT | Performed by: OBSTETRICS & GYNECOLOGY

## 2022-01-19 PROCEDURE — C9803 HOPD COVID-19 SPEC COLLECT: HCPCS

## 2022-01-19 PROCEDURE — G0463 HOSPITAL OUTPT CLINIC VISIT: HCPCS

## 2022-01-31 ENCOUNTER — ROUTINE PRENATAL (OUTPATIENT)
Dept: OBSTETRICS AND GYNECOLOGY | Facility: CLINIC | Age: 27
End: 2022-01-31

## 2022-01-31 VITALS — BODY MASS INDEX: 34.06 KG/M2 | SYSTOLIC BLOOD PRESSURE: 122 MMHG | WEIGHT: 211 LBS | DIASTOLIC BLOOD PRESSURE: 80 MMHG

## 2022-01-31 DIAGNOSIS — Z34.03 ENCOUNTER FOR SUPERVISION OF NORMAL FIRST PREGNANCY IN THIRD TRIMESTER: ICD-10-CM

## 2022-01-31 DIAGNOSIS — Z3A.38 38 WEEKS GESTATION OF PREGNANCY: Primary | ICD-10-CM

## 2022-01-31 DIAGNOSIS — Z28.21 TETANUS, DIPHTHERIA, AND ACELLULAR PERTUSSIS (TDAP) VACCINATION DECLINED: ICD-10-CM

## 2022-01-31 DIAGNOSIS — Z28.21 INFLUENZA VACCINATION DECLINED BY PATIENT: ICD-10-CM

## 2022-01-31 DIAGNOSIS — F12.90 MARIJUANA USE: ICD-10-CM

## 2022-01-31 PROCEDURE — 99212 OFFICE O/P EST SF 10 MIN: CPT | Performed by: NURSE PRACTITIONER

## 2022-01-31 PROCEDURE — 87653 STREP B DNA AMP PROBE: CPT | Performed by: NURSE PRACTITIONER

## 2022-01-31 NOTE — PROGRESS NOTES
CC: Prenatal visit    Jayashree Roberts is a 26 y.o.  at 38w0d.  Doing well.  Patient reports intermittent cramping.  Denies painful contractions, LOF, or VB.  Reports good FM.    /80   Wt 95.7 kg (211 lb)   LMP 05/10/2021 (Within Days)   BMI 34.06 kg/m²   SVE: 0.5           Problems (from 21 to present)     Problem Noted Resolved    Tetanus, diphtheria, and acellular pertussis (Tdap) vaccination declined 2021 by Shy Paris MD No    Influenza vaccination declined by patient 2021 by Shy Paris MD No    Marijuana use 10/29/2021 by Lori Ambriz DO No    Overview Signed 10/29/2021 11:04 PM by Lori Ambriz DO     +THC IOB, denies current use         Encounter for supervision of normal first pregnancy in third trimester 2021 by Shy Paris MD No    Overview Addendum 2021  1:23 PM by Shy Paris MD     A POS/ Rubella immune/ GBS @ 36wks  Dating: LMP  Tdap: declined 21  Flu: declined 21  Anatomy: wnl  1h Glucola: elevated; 3HR pass  Lab Results   Component Value Date    HGB 13.5 2021    HCT 39.4 2021     2021     Feeding Method: undecided, considering both  BC plan: ? [email or web address] info 10/27           Previous Version          A/P: Jayashree Roberts is a 26 y.o.  at 38w0d.  Discussed comfort measures  - RTC in 1 Weeks, GBS obtained today.      Diagnosis Plan   1. 38 weeks gestation of pregnancy     2. Encounter for supervision of normal first pregnancy in third trimester     3. Tetanus, diphtheria, and acellular pertussis (Tdap) vaccination declined     4. Influenza vaccination declined by patient     5. Marijuana use         MAUREEN Brizuela  2022  09:48 CST

## 2022-02-01 LAB — GROUP B STREP, DNA: NEGATIVE

## 2022-02-06 ENCOUNTER — HOSPITAL ENCOUNTER (OUTPATIENT)
Facility: HOSPITAL | Age: 27
Discharge: HOME | End: 2022-02-06
Attending: OBSTETRICS & GYNECOLOGY | Admitting: OBSTETRICS & GYNECOLOGY

## 2022-02-06 VITALS
HEART RATE: 93 BPM | WEIGHT: 212 LBS | SYSTOLIC BLOOD PRESSURE: 135 MMHG | TEMPERATURE: 98.4 F | RESPIRATION RATE: 18 BRPM | DIASTOLIC BLOOD PRESSURE: 77 MMHG | BODY MASS INDEX: 34.07 KG/M2 | HEIGHT: 66 IN

## 2022-02-06 LAB
A1 MICROGLOB PLACENTAL VAG QL: NEGATIVE
ALBUMIN SERPL-MCNC: 3.3 G/DL (ref 3.5–5.2)
ALBUMIN/GLOB SERPL: 1.1 G/DL
ALP SERPL-CCNC: 164 U/L (ref 39–117)
ALT SERPL W P-5'-P-CCNC: 14 U/L (ref 1–33)
ANION GAP SERPL CALCULATED.3IONS-SCNC: 9 MMOL/L (ref 5–15)
AST SERPL-CCNC: 14 U/L (ref 1–32)
BACTERIA UR QL AUTO: ABNORMAL /HPF
BASOPHILS # BLD AUTO: 0.04 10*3/MM3 (ref 0–0.2)
BASOPHILS NFR BLD AUTO: 0.3 % (ref 0–1.5)
BILIRUB SERPL-MCNC: 0.4 MG/DL (ref 0–1.2)
BILIRUB UR QL STRIP: NEGATIVE
BUN SERPL-MCNC: 8 MG/DL (ref 6–20)
BUN/CREAT SERPL: 13.6 (ref 7–25)
CALCIUM SPEC-SCNC: 8.9 MG/DL (ref 8.6–10.5)
CHLORIDE SERPL-SCNC: 104 MMOL/L (ref 98–107)
CLARITY UR: ABNORMAL
CO2 SERPL-SCNC: 24 MMOL/L (ref 22–29)
COLOR UR: YELLOW
CREAT SERPL-MCNC: 0.59 MG/DL (ref 0.57–1)
DEPRECATED RDW RBC AUTO: 40.5 FL (ref 37–54)
EOSINOPHIL # BLD AUTO: 0.07 10*3/MM3 (ref 0–0.4)
EOSINOPHIL NFR BLD AUTO: 0.5 % (ref 0.3–6.2)
ERYTHROCYTE [DISTWIDTH] IN BLOOD BY AUTOMATED COUNT: 13.7 % (ref 12.3–15.4)
GFR SERPL CREATININE-BSD FRML MDRD: 122 ML/MIN/1.73
GLOBULIN UR ELPH-MCNC: 2.9 GM/DL
GLUCOSE SERPL-MCNC: 119 MG/DL (ref 65–99)
GLUCOSE UR STRIP-MCNC: ABNORMAL MG/DL
HCT VFR BLD AUTO: 32 % (ref 34–46.6)
HGB BLD-MCNC: 10.7 G/DL (ref 12–15.9)
HGB UR QL STRIP.AUTO: NEGATIVE
HYALINE CASTS UR QL AUTO: ABNORMAL /LPF
IMM GRANULOCYTES # BLD AUTO: 0.12 10*3/MM3 (ref 0–0.05)
IMM GRANULOCYTES NFR BLD AUTO: 0.9 % (ref 0–0.5)
KETONES UR QL STRIP: NEGATIVE
LEUKOCYTE ESTERASE UR QL STRIP.AUTO: ABNORMAL
LYMPHOCYTES # BLD AUTO: 1.83 10*3/MM3 (ref 0.7–3.1)
LYMPHOCYTES NFR BLD AUTO: 14 % (ref 19.6–45.3)
MCH RBC QN AUTO: 27.9 PG (ref 26.6–33)
MCHC RBC AUTO-ENTMCNC: 33.4 G/DL (ref 31.5–35.7)
MCV RBC AUTO: 83.6 FL (ref 79–97)
MONOCYTES # BLD AUTO: 0.96 10*3/MM3 (ref 0.1–0.9)
MONOCYTES NFR BLD AUTO: 7.3 % (ref 5–12)
NEUTROPHILS NFR BLD AUTO: 10.08 10*3/MM3 (ref 1.7–7)
NEUTROPHILS NFR BLD AUTO: 77 % (ref 42.7–76)
NITRITE UR QL STRIP: NEGATIVE
NRBC BLD AUTO-RTO: 0 /100 WBC (ref 0–0.2)
PH UR STRIP.AUTO: 7 [PH] (ref 5–9)
PLATELET # BLD AUTO: 321 10*3/MM3 (ref 140–450)
PMV BLD AUTO: 9.7 FL (ref 6–12)
POTASSIUM SERPL-SCNC: 3.8 MMOL/L (ref 3.5–5.2)
PROT SERPL-MCNC: 6.2 G/DL (ref 6–8.5)
PROT UR QL STRIP: ABNORMAL
RBC # BLD AUTO: 3.83 10*6/MM3 (ref 3.77–5.28)
RBC # UR STRIP: ABNORMAL /HPF
REF LAB TEST METHOD: ABNORMAL
SODIUM SERPL-SCNC: 137 MMOL/L (ref 136–145)
SP GR UR STRIP: 1.02 (ref 1–1.03)
SQUAMOUS #/AREA URNS HPF: ABNORMAL /HPF
UROBILINOGEN UR QL STRIP: ABNORMAL
WBC # UR STRIP: ABNORMAL /HPF
WBC NRBC COR # BLD: 13.1 10*3/MM3 (ref 3.4–10.8)

## 2022-02-06 PROCEDURE — 84112 EVAL AMNIOTIC FLUID PROTEIN: CPT | Performed by: OBSTETRICS & GYNECOLOGY

## 2022-02-06 PROCEDURE — 85025 COMPLETE CBC W/AUTO DIFF WBC: CPT | Performed by: OBSTETRICS & GYNECOLOGY

## 2022-02-06 PROCEDURE — 59025 FETAL NON-STRESS TEST: CPT | Performed by: OBSTETRICS & GYNECOLOGY

## 2022-02-06 PROCEDURE — 59025 FETAL NON-STRESS TEST: CPT

## 2022-02-06 PROCEDURE — 80053 COMPREHEN METABOLIC PANEL: CPT | Performed by: OBSTETRICS & GYNECOLOGY

## 2022-02-06 PROCEDURE — 36415 COLL VENOUS BLD VENIPUNCTURE: CPT | Performed by: OBSTETRICS & GYNECOLOGY

## 2022-02-06 PROCEDURE — G0463 HOSPITAL OUTPT CLINIC VISIT: HCPCS

## 2022-02-06 PROCEDURE — 81001 URINALYSIS AUTO W/SCOPE: CPT | Performed by: OBSTETRICS & GYNECOLOGY

## 2022-02-06 NOTE — NON STRESS TEST
Jayashree Roberts, a  at 38w6d with an LEONARDO of 2022, by Last Menstrual Period, was seen at [facility] for a nonstress test.    Chief Complaint   Patient presents with   • Rupture of Membranes     woke up at around 9am and noted area of fluid on bed sheets       Patient Active Problem List   Diagnosis   • Encounter for supervision of normal first pregnancy in third trimester   • Marijuana use   • Pain in both lower legs   • Tetanus, diphtheria, and acellular pertussis (Tdap) vaccination declined   • Influenza vaccination declined by patient       Start Time: 1230         Stop Time: 1300    Strip reactive, reviewed with BALJINDER Juárez RN

## 2022-02-07 ENCOUNTER — ROUTINE PRENATAL (OUTPATIENT)
Dept: OBSTETRICS AND GYNECOLOGY | Facility: CLINIC | Age: 27
End: 2022-02-07

## 2022-02-07 VITALS — BODY MASS INDEX: 34.8 KG/M2 | WEIGHT: 215.6 LBS | SYSTOLIC BLOOD PRESSURE: 112 MMHG | DIASTOLIC BLOOD PRESSURE: 76 MMHG

## 2022-02-07 DIAGNOSIS — Z34.03 ENCOUNTER FOR SUPERVISION OF NORMAL FIRST PREGNANCY IN THIRD TRIMESTER: ICD-10-CM

## 2022-02-07 DIAGNOSIS — Z28.21 TETANUS, DIPHTHERIA, AND ACELLULAR PERTUSSIS (TDAP) VACCINATION DECLINED: ICD-10-CM

## 2022-02-07 DIAGNOSIS — F12.90 MARIJUANA USE: ICD-10-CM

## 2022-02-07 DIAGNOSIS — Z28.21 INFLUENZA VACCINATION DECLINED BY PATIENT: ICD-10-CM

## 2022-02-07 PROCEDURE — 99212 OFFICE O/P EST SF 10 MIN: CPT | Performed by: STUDENT IN AN ORGANIZED HEALTH CARE EDUCATION/TRAINING PROGRAM

## 2022-02-07 NOTE — PROGRESS NOTES
CC: Prenatal visit    Jayashree Roberts is a 26 y.o.  at 39w0d.  Doing well.  No complaints.  Denies contractions, LOF, or VB.  Reports good FM.    Patient presented to hospital yesterday for testing that was negative for rupture of membranes. Was noted to have high blood pressure. Blood pressure within normal limits during today's regularly scheduled prenatal visit. Patient does not desire induction.      /76   Wt 97.8 kg (215 lb 9.6 oz)   LMP 05/10/2021 (Within Days)   BMI 34.80 kg/m²   Fundal Height (cm): 38 cm  Fetal Heart Rate: 143     Problems (from 21 to present)     Problem Noted Resolved    Tetanus, diphtheria, and acellular pertussis (Tdap) vaccination declined 2021 by Shy Paris MD No    Influenza vaccination declined by patient 2021 by Shy Paris MD No    Marijuana use 10/29/2021 by Lori Ambriz DO No    Overview Signed 10/29/2021 11:04 PM by Lori Ambriz DO     +THC IOB, denies current use         Encounter for supervision of normal first pregnancy in third trimester 2021 by Shy Paris MD No    Overview Addendum 2021  1:23 PM by Shy Paris MD     A POS/ Rubella immune/ GBS @ 36wks  Dating: LMP  Tdap: declined 21  Flu: declined 21  Anatomy: wnl  1h Glucola: elevated; 3HR pass  Lab Results   Component Value Date    HGB 13.5 2021    HCT 39.4 2021     2021     Feeding Method: undecided, considering both  BC plan: ? Gave [email or web address] info 10/27           Previous Version          A/P: Jayashree Roberts is a 26 y.o.  at 39w0d.  -Does not meet criteria for gestational hypertension   -Does not desire induction at this time   - RTC in 1 week     Diagnosis Plan   1. Tetanus, diphtheria, and acellular pertussis (Tdap) vaccination declined     2. Influenza vaccination declined by patient     3. Marijuana use     4. Encounter for supervision of normal first pregnancy in third  trimester         Jorge Lewis MD  2/7/2022  11:21 CST

## 2022-02-07 NOTE — PROGRESS NOTES
I have seen the patient. I have reviewed the notes, assessments, and/or procedures performed by Jorge Lewis MD, I concur with her/his documentation of Jayashree Roberts.     26 y.o.  at 39w0d here for prenatal visit. Was seen on L&D over the weekend for rule out ROM. Pt was noted to have 1 elevated BP while in triage; pre-e labs wnl. She denies HA/Visual disturbance/RUQ pain. Does report having some hand swelling.    BP wnl today.  Pt declines cervical exam.  Pt declines EIOL    RTC in 1 week  FKC reviewed, labor signs/symptoms discussed. Patient knows which phone # to call/where to come if she thinks she is in labor.    Signature  Shy Paris MD  [facility]  [address] [place] [id number]  Office: [phone]      This document has been electronically signed by Shy Paris MD on 2022 12:53 CST

## 2022-02-13 ENCOUNTER — HOSPITAL ENCOUNTER (INPATIENT)
Facility: HOSPITAL | Age: 27
LOS: 3 days | Discharge: HOME | End: 2022-02-16
Attending: STUDENT IN AN ORGANIZED HEALTH CARE EDUCATION/TRAINING PROGRAM | Admitting: STUDENT IN AN ORGANIZED HEALTH CARE EDUCATION/TRAINING PROGRAM

## 2022-02-13 PROBLEM — Z37.9 NORMAL LABOR: Status: ACTIVE | Noted: 2022-02-13

## 2022-02-13 PROCEDURE — 87636 SARSCOV2 & INF A&B AMP PRB: CPT | Performed by: STUDENT IN AN ORGANIZED HEALTH CARE EDUCATION/TRAINING PROGRAM

## 2022-02-13 RX ORDER — SODIUM CHLORIDE 0.9 % (FLUSH) 0.9 %
10 SYRINGE (ML) INJECTION AS NEEDED
Status: DISCONTINUED | OUTPATIENT
Start: 2022-02-13 | End: 2022-02-14 | Stop reason: HOSPADM

## 2022-02-13 RX ORDER — SODIUM CHLORIDE, SODIUM LACTATE, POTASSIUM CHLORIDE, CALCIUM CHLORIDE 600; 310; 30; 20 MG/100ML; MG/100ML; MG/100ML; MG/100ML
125 INJECTION, SOLUTION INTRAVENOUS CONTINUOUS
Status: DISCONTINUED | OUTPATIENT
Start: 2022-02-14 | End: 2022-02-14

## 2022-02-13 RX ORDER — SODIUM CHLORIDE 0.9 % (FLUSH) 0.9 %
10 SYRINGE (ML) INJECTION EVERY 12 HOURS SCHEDULED
Status: DISCONTINUED | OUTPATIENT
Start: 2022-02-14 | End: 2022-02-14 | Stop reason: HOSPADM

## 2022-02-13 RX ORDER — LIDOCAINE HYDROCHLORIDE 10 MG/ML
5 INJECTION, SOLUTION EPIDURAL; INFILTRATION; INTRACAUDAL; PERINEURAL AS NEEDED
Status: DISCONTINUED | OUTPATIENT
Start: 2022-02-13 | End: 2022-02-14 | Stop reason: HOSPADM

## 2022-02-13 RX ORDER — ACETAMINOPHEN 325 MG/1
650 TABLET ORAL EVERY 6 HOURS PRN
Status: ON HOLD | COMMUNITY
End: 2022-02-15 | Stop reason: SDUPTHER

## 2022-02-13 RX ORDER — SODIUM CHLORIDE, SODIUM LACTATE, POTASSIUM CHLORIDE, CALCIUM CHLORIDE 600; 310; 30; 20 MG/100ML; MG/100ML; MG/100ML; MG/100ML
INJECTION, SOLUTION INTRAVENOUS
Status: COMPLETED
Start: 2022-02-13 | End: 2022-02-14

## 2022-02-14 ENCOUNTER — ANESTHESIA (OUTPATIENT)
Dept: LABOR AND DELIVERY | Facility: HOSPITAL | Age: 27
End: 2022-02-14

## 2022-02-14 ENCOUNTER — ANESTHESIA EVENT (OUTPATIENT)
Dept: LABOR AND DELIVERY | Facility: HOSPITAL | Age: 27
End: 2022-02-14

## 2022-02-14 LAB
ABO GROUP BLD: NORMAL
AMPHET+METHAMPHET UR QL: NEGATIVE
AMPHETAMINES UR QL: NEGATIVE
BARBITURATES UR QL SCN: NEGATIVE
BENZODIAZ UR QL SCN: NEGATIVE
BLD GP AB SCN SERPL QL: NEGATIVE
BUPRENORPHINE SERPL-MCNC: NEGATIVE NG/ML
CANNABINOIDS SERPL QL: NEGATIVE
COCAINE UR QL: NEGATIVE
DEPRECATED RDW RBC AUTO: 40.7 FL (ref 37–54)
ERYTHROCYTE [DISTWIDTH] IN BLOOD BY AUTOMATED COUNT: 13.8 % (ref 12.3–15.4)
FLUAV RNA RESP QL NAA+PROBE: NOT DETECTED
FLUBV RNA RESP QL NAA+PROBE: NOT DETECTED
HCT VFR BLD AUTO: 30.9 % (ref 34–46.6)
HGB BLD-MCNC: 10.2 G/DL (ref 12–15.9)
Lab: NORMAL
MCH RBC QN AUTO: 27 PG (ref 26.6–33)
MCHC RBC AUTO-ENTMCNC: 33 G/DL (ref 31.5–35.7)
MCV RBC AUTO: 81.7 FL (ref 79–97)
METHADONE UR QL SCN: NEGATIVE
OPIATES UR QL: NEGATIVE
OXYCODONE UR QL SCN: NEGATIVE
PCP UR QL SCN: NEGATIVE
PLATELET # BLD AUTO: 333 10*3/MM3 (ref 140–450)
PMV BLD AUTO: 10 FL (ref 6–12)
PROPOXYPH UR QL: NEGATIVE
RBC # BLD AUTO: 3.78 10*6/MM3 (ref 3.77–5.28)
RH BLD: POSITIVE
SARS-COV-2 RNA RESP QL NAA+PROBE: NOT DETECTED
T&S EXPIRATION DATE: NORMAL
TRICYCLICS UR QL SCN: NEGATIVE
WBC NRBC COR # BLD: 12.38 10*3/MM3 (ref 3.4–10.8)

## 2022-02-14 PROCEDURE — 86850 RBC ANTIBODY SCREEN: CPT | Performed by: STUDENT IN AN ORGANIZED HEALTH CARE EDUCATION/TRAINING PROGRAM

## 2022-02-14 PROCEDURE — 86901 BLOOD TYPING SEROLOGIC RH(D): CPT | Performed by: STUDENT IN AN ORGANIZED HEALTH CARE EDUCATION/TRAINING PROGRAM

## 2022-02-14 PROCEDURE — 0KQM0ZZ REPAIR PERINEUM MUSCLE, OPEN APPROACH: ICD-10-PCS | Performed by: STUDENT IN AN ORGANIZED HEALTH CARE EDUCATION/TRAINING PROGRAM

## 2022-02-14 PROCEDURE — 0UQMXZZ REPAIR VULVA, EXTERNAL APPROACH: ICD-10-PCS | Performed by: STUDENT IN AN ORGANIZED HEALTH CARE EDUCATION/TRAINING PROGRAM

## 2022-02-14 PROCEDURE — 80306 DRUG TEST PRSMV INSTRMNT: CPT | Performed by: STUDENT IN AN ORGANIZED HEALTH CARE EDUCATION/TRAINING PROGRAM

## 2022-02-14 PROCEDURE — 94799 UNLISTED PULMONARY SVC/PX: CPT

## 2022-02-14 PROCEDURE — C1755 CATHETER, INTRASPINAL: HCPCS | Performed by: NURSE ANESTHETIST, CERTIFIED REGISTERED

## 2022-02-14 PROCEDURE — 59409 OBSTETRICAL CARE: CPT | Performed by: STUDENT IN AN ORGANIZED HEALTH CARE EDUCATION/TRAINING PROGRAM

## 2022-02-14 PROCEDURE — 86900 BLOOD TYPING SEROLOGIC ABO: CPT | Performed by: STUDENT IN AN ORGANIZED HEALTH CARE EDUCATION/TRAINING PROGRAM

## 2022-02-14 PROCEDURE — C1755 CATHETER, INTRASPINAL: HCPCS

## 2022-02-14 PROCEDURE — 51703 INSERT BLADDER CATH COMPLEX: CPT

## 2022-02-14 PROCEDURE — 85027 COMPLETE CBC AUTOMATED: CPT | Performed by: STUDENT IN AN ORGANIZED HEALTH CARE EDUCATION/TRAINING PROGRAM

## 2022-02-14 PROCEDURE — S0260 H&P FOR SURGERY: HCPCS | Performed by: STUDENT IN AN ORGANIZED HEALTH CARE EDUCATION/TRAINING PROGRAM

## 2022-02-14 RX ORDER — CARBOPROST TROMETHAMINE 250 UG/ML
250 INJECTION, SOLUTION INTRAMUSCULAR
Status: DISCONTINUED | OUTPATIENT
Start: 2022-02-14 | End: 2022-02-14 | Stop reason: HOSPADM

## 2022-02-14 RX ORDER — ONDANSETRON 4 MG/1
4 TABLET, FILM COATED ORAL EVERY 6 HOURS PRN
Status: DISCONTINUED | OUTPATIENT
Start: 2022-02-14 | End: 2022-02-16 | Stop reason: HOSPADM

## 2022-02-14 RX ORDER — HYDROCORTISONE 25 MG/G
1 CREAM TOPICAL AS NEEDED
Status: DISCONTINUED | OUTPATIENT
Start: 2022-02-14 | End: 2022-02-16 | Stop reason: HOSPADM

## 2022-02-14 RX ORDER — ACETAMINOPHEN 500 MG
1000 TABLET ORAL EVERY 6 HOURS
Status: DISCONTINUED | OUTPATIENT
Start: 2022-02-14 | End: 2022-02-16 | Stop reason: HOSPADM

## 2022-02-14 RX ORDER — OXYTOCIN/0.9 % SODIUM CHLORIDE 30/500 ML
2 PLASTIC BAG, INJECTION (ML) INTRAVENOUS
Status: DISCONTINUED | OUTPATIENT
Start: 2022-02-14 | End: 2022-02-14

## 2022-02-14 RX ORDER — BISACODYL 10 MG
10 SUPPOSITORY, RECTAL RECTAL DAILY PRN
Status: DISCONTINUED | OUTPATIENT
Start: 2022-02-15 | End: 2022-02-16 | Stop reason: HOSPADM

## 2022-02-14 RX ORDER — ONDANSETRON 2 MG/ML
4 INJECTION INTRAMUSCULAR; INTRAVENOUS EVERY 6 HOURS PRN
Status: DISCONTINUED | OUTPATIENT
Start: 2022-02-14 | End: 2022-02-16 | Stop reason: HOSPADM

## 2022-02-14 RX ORDER — LIDOCAINE HYDROCHLORIDE 10 MG/ML
5 INJECTION, SOLUTION EPIDURAL; INFILTRATION; INTRACAUDAL; PERINEURAL ONCE
Status: COMPLETED | OUTPATIENT
Start: 2022-02-14 | End: 2022-02-14

## 2022-02-14 RX ORDER — PRENATAL VIT/IRON FUM/FOLIC AC 27MG-0.8MG
1 TABLET ORAL DAILY
Status: DISCONTINUED | OUTPATIENT
Start: 2022-02-15 | End: 2022-02-16 | Stop reason: HOSPADM

## 2022-02-14 RX ORDER — MISOPROSTOL 200 UG/1
400 TABLET ORAL ONCE AS NEEDED
Status: COMPLETED | OUTPATIENT
Start: 2022-02-14 | End: 2022-02-14

## 2022-02-14 RX ORDER — LIDOCAINE HYDROCHLORIDE AND EPINEPHRINE 15; 5 MG/ML; UG/ML
INJECTION, SOLUTION EPIDURAL AS NEEDED
Status: DISCONTINUED | OUTPATIENT
Start: 2022-02-14 | End: 2022-02-14 | Stop reason: SURG

## 2022-02-14 RX ORDER — OXYTOCIN/0.9 % SODIUM CHLORIDE 30/500 ML
650 PLASTIC BAG, INJECTION (ML) INTRAVENOUS ONCE
Status: COMPLETED | OUTPATIENT
Start: 2022-02-14 | End: 2022-02-14

## 2022-02-14 RX ORDER — CALCIUM CARBONATE 200(500)MG
2 TABLET,CHEWABLE ORAL 3 TIMES DAILY PRN
Status: DISCONTINUED | OUTPATIENT
Start: 2022-02-14 | End: 2022-02-16 | Stop reason: HOSPADM

## 2022-02-14 RX ORDER — METHYLERGONOVINE MALEATE 0.2 MG/ML
200 INJECTION INTRAVENOUS ONCE AS NEEDED
Status: DISCONTINUED | OUTPATIENT
Start: 2022-02-14 | End: 2022-02-14 | Stop reason: HOSPADM

## 2022-02-14 RX ORDER — DOCUSATE SODIUM 100 MG/1
100 CAPSULE, LIQUID FILLED ORAL 2 TIMES DAILY
Status: DISCONTINUED | OUTPATIENT
Start: 2022-02-14 | End: 2022-02-16 | Stop reason: HOSPADM

## 2022-02-14 RX ORDER — OXYTOCIN/0.9 % SODIUM CHLORIDE 30/500 ML
85 PLASTIC BAG, INJECTION (ML) INTRAVENOUS ONCE
Status: DISCONTINUED | OUTPATIENT
Start: 2022-02-14 | End: 2022-02-14

## 2022-02-14 RX ORDER — OXYTOCIN/0.9 % SODIUM CHLORIDE 30/500 ML
650 PLASTIC BAG, INJECTION (ML) INTRAVENOUS ONCE
Status: DISCONTINUED | OUTPATIENT
Start: 2022-02-14 | End: 2022-02-16 | Stop reason: HOSPADM

## 2022-02-14 RX ORDER — MISOPROSTOL 200 UG/1
TABLET ORAL
Status: DISPENSED
Start: 2022-02-14 | End: 2022-02-15

## 2022-02-14 RX ORDER — OXYTOCIN/0.9 % SODIUM CHLORIDE 30/500 ML
PLASTIC BAG, INJECTION (ML) INTRAVENOUS
Status: COMPLETED
Start: 2022-02-14 | End: 2022-02-14

## 2022-02-14 RX ORDER — SODIUM CHLORIDE 0.9 % (FLUSH) 0.9 %
1-10 SYRINGE (ML) INJECTION AS NEEDED
Status: DISCONTINUED | OUTPATIENT
Start: 2022-02-14 | End: 2022-02-16 | Stop reason: HOSPADM

## 2022-02-14 RX ORDER — ACETAMINOPHEN 500 MG
1000 TABLET ORAL EVERY 6 HOURS PRN
Status: DISCONTINUED | OUTPATIENT
Start: 2022-02-14 | End: 2022-02-14

## 2022-02-14 RX ORDER — BUPIVACAINE HYDROCHLORIDE 2.5 MG/ML
INJECTION, SOLUTION EPIDURAL; INFILTRATION; INTRACAUDAL AS NEEDED
Status: DISCONTINUED | OUTPATIENT
Start: 2022-02-14 | End: 2022-02-14 | Stop reason: SURG

## 2022-02-14 RX ORDER — OXYTOCIN/0.9 % SODIUM CHLORIDE 30/500 ML
85 PLASTIC BAG, INJECTION (ML) INTRAVENOUS ONCE
Status: DISCONTINUED | OUTPATIENT
Start: 2022-02-14 | End: 2022-02-16 | Stop reason: HOSPADM

## 2022-02-14 RX ORDER — FERROUS SULFATE TAB EC 324 MG (65 MG FE EQUIVALENT) 324 (65 FE) MG
324 TABLET DELAYED RESPONSE ORAL 2 TIMES DAILY WITH MEALS
Status: DISCONTINUED | OUTPATIENT
Start: 2022-02-14 | End: 2022-02-16 | Stop reason: HOSPADM

## 2022-02-14 RX ORDER — IBUPROFEN 800 MG/1
800 TABLET ORAL EVERY 8 HOURS
Status: DISCONTINUED | OUTPATIENT
Start: 2022-02-14 | End: 2022-02-16 | Stop reason: HOSPADM

## 2022-02-14 RX ADMIN — Medication 2 UNITS: at 12:50

## 2022-02-14 RX ADMIN — MISOPROSTOL 400 MCG: 200 TABLET ORAL at 13:58

## 2022-02-14 RX ADMIN — LIDOCAINE HYDROCHLORIDE AND EPINEPHRINE 3 ML: 15; 5 INJECTION, SOLUTION EPIDURAL at 04:18

## 2022-02-14 RX ADMIN — BUPIVACAINE HYDROCHLORIDE 10 ML: 2.5 INJECTION, SOLUTION EPIDURAL; INFILTRATION; INTRACAUDAL; PERINEURAL at 04:25

## 2022-02-14 RX ADMIN — IBUPROFEN 800 MG: 800 TABLET, FILM COATED ORAL at 20:55

## 2022-02-14 RX ADMIN — OXYTOCIN-SODIUM CHLORIDE 0.9% IV SOLN 30 UNIT/500ML 650 ML/HR: 30-0.9/5 SOLUTION at 13:35

## 2022-02-14 RX ADMIN — SODIUM CHLORIDE, POTASSIUM CHLORIDE, SODIUM LACTATE AND CALCIUM CHLORIDE 125 ML/HR: 600; 310; 30; 20 INJECTION, SOLUTION INTRAVENOUS at 00:00

## 2022-02-14 RX ADMIN — OXYTOCIN-SODIUM CHLORIDE 0.9% IV SOLN 30 UNIT/500ML 2 UNITS: 30-0.9/5 SOLUTION at 12:50

## 2022-02-14 RX ADMIN — Medication 10 ML/HR: at 04:34

## 2022-02-14 RX ADMIN — ACETAMINOPHEN 1000 MG: 500 TABLET, FILM COATED ORAL at 15:48

## 2022-02-14 RX ADMIN — DOCUSATE SODIUM 100 MG: 100 CAPSULE, LIQUID FILLED ORAL at 20:55

## 2022-02-14 RX ADMIN — LIDOCAINE HYDROCHLORIDE 5 ML: 10 INJECTION, SOLUTION EPIDURAL; INFILTRATION; INTRACAUDAL; PERINEURAL at 13:40

## 2022-02-14 RX ADMIN — SODIUM CHLORIDE, POTASSIUM CHLORIDE, SODIUM LACTATE AND CALCIUM CHLORIDE 125 ML/HR: 600; 310; 30; 20 INJECTION, SOLUTION INTRAVENOUS at 03:46

## 2022-02-14 RX ADMIN — FERROUS SULFATE TAB EC 324 MG (65 MG FE EQUIVALENT) 324 MG: 324 (65 FE) TABLET DELAYED RESPONSE at 20:55

## 2022-02-14 NOTE — PLAN OF CARE
Problem: Adult Inpatient Plan of Care  Goal: Plan of Care Review  Outcome: Ongoing, Progressing  Flowsheets (Taken 2/14/2022 1532)  Progress: improving  Plan of Care Reviewed With: patient  Outcome Summary: vss, delivered baby girl Christa. Bleeding light, ff @umbilicus, will transfer to mother baby at 1415. 2nd degree, bilateral periurethral repair, breastfeeding  Goal: Patient-Specific Goal (Individualized)  Outcome: Ongoing, Progressing  Goal: Absence of Hospital-Acquired Illness or Injury  Outcome: Ongoing, Progressing  Intervention: Identify and Manage Fall Risk  Recent Flowsheet Documentation  Taken 2/14/2022 0732 by Katty Campbell RN  Safety Promotion/Fall Prevention:   safety round/check completed   assistive device/personal items within reach   clutter free environment maintained  Intervention: Prevent Skin Injury  Recent Flowsheet Documentation  Taken 2/14/2022 0732 by Katty Campbell RN  Body Position: side-lying, left  Intervention: Prevent and Manage VTE (venous thromboembolism) Risk  Recent Flowsheet Documentation  Taken 2/14/2022 1415 by Katty Campbell RN  VTE Prevention/Management:   sequential compression devices off   patient refused intervention  Taken 2/14/2022 0732 by Katty Campbell RN  VTE Prevention/Management:   bilateral   sequential compression devices on  Intervention: Prevent Infection  Recent Flowsheet Documentation  Taken 2/14/2022 1415 by Katty Campbell RN  Infection Prevention:   visitors restricted/screened   hand hygiene promoted  Taken 2/14/2022 0732 by Katty Campbell RN  Infection Prevention:   visitors restricted/screened   hand hygiene promoted  Goal: Optimal Comfort and Wellbeing  Outcome: Ongoing, Progressing  Intervention: Provide Person-Centered Care  Recent Flowsheet Documentation  Taken 2/14/2022 1415 by Katty Campbell RN  Trust Relationship/Rapport:   care explained   questions encouraged   questions answered   choices  provided  Taken 2022 07 by Katty Campbell RN  Trust Relationship/Rapport:   care explained   questions encouraged   questions answered   choices provided  Goal: Readiness for Transition of Care  Outcome: Ongoing, Progressing     Problem:  Fall Injury Risk  Goal: Absence of Fall, Infant Drop and Related Injury  Outcome: Ongoing, Progressing  Intervention: Promote Injury-Free Environment  Recent Flowsheet Documentation  Taken 2022 by Katty Campbell RN  Safety Promotion/Fall Prevention:   safety round/check completed   assistive device/personal items within reach   clutter free environment maintained     Problem: Adjustment to Role Transition (Postpartum Vaginal Delivery)  Goal: Successful Maternal Role Transition  Outcome: Ongoing, Progressing     Problem: Bleeding (Postpartum Vaginal Delivery)  Goal: Hemostasis  Outcome: Ongoing, Progressing     Problem: Infection (Postpartum Vaginal Delivery)  Goal: Absence of Infection Signs and Symptoms  Outcome: Ongoing, Progressing     Problem: Pain (Postpartum Vaginal Delivery)  Goal: Acceptable Pain Control  Outcome: Ongoing, Progressing     Problem: Urinary Retention (Postpartum Vaginal Delivery)  Goal: Effective Urinary Elimination  Outcome: Ongoing, Progressing   Goal Outcome Evaluation:  Plan of Care Reviewed With: patient        Progress: improving  Outcome Summary: vss, delivered baby girl Christa. Bleeding light, ff @umbilicus, will transfer to mother baby at 1415. 2nd degree, bilateral periurethral repair, breastfeeding

## 2022-02-14 NOTE — H&P
HISTORY & PHYSICAL - Obstetrics  [facility]    Name: Jayashree Roberts  MRN: [id number]  Location: L768/1  Date: 2022  CSN: [id number]      CHIEF COMPLAINT:  Term labor    HISTORY OF PRESENT ILLNESS  Jayashree Roberts is a 26 y.o.  at 40w0d gestational age by LMP = 10 week 1st TRI US suggesting Estimated Date of Delivery: 22.  The patient presents with a chief complaint of increasing contractions since Saturday, worsening since  last evening.  Denies LOF.  Scant vaginal bleeding with loss of mucous plug.  Regular contractions.  Good FM.    Patient denies any chest pain, palpitations, headaches, lightheadedness, shortness of breath, cough, nausea, vomiting, diarrhea, constipation, fever, or chills.    ROS  Review of Systems   Constitutional: Negative.    HENT: Negative.    Respiratory: Negative.    Cardiovascular: Negative.    Gastrointestinal: Negative.    Genitourinary: Negative.    Musculoskeletal: Negative.    Skin: Negative.    Psychiatric/Behavioral: Negative.        PRENATAL LAB RESULTS  Prenatal labs reviewed  External Prenatal Results     Pregnancy Outside Results - Transcribed From Office Records - See Scanned Records For Details     Test Value Date Time    ABO  A  22 0012    Rh  Positive  22 0012    Antibody Screen  Negative  22 0012       Negative  21 1406    Varicella IgG       Rubella  1.18 index 21 1406    Hgb  10.2 g/dL 22 0012       10.7 g/dL 22 1321       10.6 g/dL 21 0959       12.3 g/dL 21 1211       13.5 g/dL 21 1406    Hct  30.9 % 22 0012       32.0 % 22 1321       31.2 % 21 0959       37.2 % 21 1211       39.4 % 21 1406    Glucose Fasting GTT       Glucose Tolerance Test 1 hour       Glucose Tolerance Test 3 hour  102 mg/dL 21 0854    Gonorrhea (discrete)  Negative  21 1406    Chlamydia (discrete)  Negative  21 1406    RPR  Non-Reactive  21  1406    VDRL       Syphilis Antibody       HBsAg  Non-Reactive  06/23/21 1406    Herpes Simplex Virus PCR       Herpes Simplex VIrus Culture       HIV  Non-Reactive  06/23/21 1406    Hep C RNA Quant PCR       Hep C Antibody  Non-Reactive  06/23/21 1406    AFP       Group B Strep  Negative  01/31/22 0949    GBS Susceptibility to Clindamycin       GBS Susceptibility to Erythromycin       Fetal Fibronectin       Genetic Testing, Maternal Blood             Drug Screening     Test Value Date Time    Urine Drug Screen       Amphetamine Screen  Negative  02/14/22 0012       Negative  06/23/21 1406    Barbiturate Screen  Negative  02/14/22 0012       Negative  06/23/21 1406    Benzodiazepine Screen  Negative  02/14/22 0012       Negative  06/23/21 1406    Methadone Screen  Negative  02/14/22 0012       Negative  06/23/21 1406    Phencyclidine Screen  Negative  02/14/22 0012       Negative  06/23/21 1406    Opiates Screen  Negative  02/14/22 0012       Negative  06/23/21 1406    THC Screen  Negative  02/14/22 0012       Positive  06/23/21 1406    Cocaine Screen       Propoxyphene Screen  Negative  02/14/22 0012       Negative  06/23/21 1406    Buprenorphine Screen  Negative  02/14/22 0012       Negative  06/23/21 1406    Methamphetamine Screen       Oxycodone Screen  Negative  02/14/22 0012       Negative  06/23/21 1406    Tricyclic Antidepressants Screen  Negative  02/14/22 0012       Negative  06/23/21 1406          Legend    ^: Historical                        PRENATAL RISK FACTORS  6/21 Problems (from 06/23/21 to present)     Problem Noted Resolved    Tetanus, diphtheria, and acellular pertussis (Tdap) vaccination declined 12/14/2021 by Shy Paris MD No    Influenza vaccination declined by patient 12/14/2021 by Shy Paris MD No    Marijuana use 10/29/2021 by Lori Ambriz DO No    Overview Signed 10/29/2021 11:04 PM by Lori Ambriz DO     +THC IOB, denies current use         Encounter for  supervision of normal first pregnancy in third trimester 2021 by Shy Paris MD No    Overview Addendum 2021  1:23 PM by Shy Paris MD     A POS/ Rubella immune/ GBS @ 36wks  Dating: LMP  Tdap: declined 21  Flu: declined 21  Anatomy: wnl  1h Glucola: elevated; 3HR pass  Lab Results   Component Value Date    HGB 13.5 2021    HCT 39.4 2021     2021     Feeding Method: undecided, considering both  BC plan: ? Gave [email or web address] info 10/27           Previous Version          DATING SUMMARY  LMP (05/10/2021)-- Estimated Date of Delivery: 22 by LMP c/w 1TUS (21 at 10w5d)    OB HISTORY  OB History    Para Term  AB Living   1             SAB IAB Ectopic Molar Multiple Live Births                    # Outcome Date GA Lbr Dev/2nd Weight Sex Delivery Anes PTL Lv   1 Current              GYN HISTORY  Denies h/o sexually transmitted infections/pelvic inflammatory disease  Denies h/o abnormal pap smears, last pap was 2019 in [place], recommend repeat PP  Denies h/o gynecologic surgeries, including biopsies of the cervix    PAST MEDICAL HISTORY  Past Medical History:   Diagnosis Date   • Asthma    • History of depression    • Marijuana smoker    • Substance abuse (HCC)    • Varicella      PAST SURGICAL HISTORY  Past Surgical History:   Procedure Laterality Date   • ADENOIDECTOMY     • TONSILLECTOMY       FAMILY HISTORY  Family History   Problem Relation Age of Onset   • Thyroid disease Father    • Diabetes Mother    • Thyroid disease Sister    • Diabetes Maternal Grandmother    • No Known Problems Paternal Grandmother      SOCIAL HISTORY  Social History     Socioeconomic History   • Marital status: Single   • Highest education level: GED or equivalent   Tobacco Use   • Smoking status: Former Smoker   • Smokeless tobacco: Never Used   • Tobacco comment: quit with pregnancy   Vaping Use   • Vaping Use: Never used   Substance and Sexual Activity  "  • Alcohol use: Not Currently   • Drug use: Not Currently     Types: Marijuana   • Sexual activity: Yes     Partners: Male     Comment: last pap smear 2019 in  [place]     ALLERGIES  Allergies   Allergen Reactions   • Azithromycin Rash   • Penicillins Rash     HOME MEDICATIONS  Prior to Admission medications    Medication Sig Start Date End Date Taking? Authorizing Provider   acetaminophen (TYLENOL) 325 MG tablet Take 650 mg by mouth Every 6 (Six) Hours As Needed for Mild Pain .   Yes Provider, MD Jaylyn   Prenatal Vit-Fe Fumarate-FA (prenatal vitamin 27-0.8) 27-0.8 MG tablet tablet Take  by mouth Daily.   Yes Provider, Jaylyn, MD   ondansetron (Zofran) 4 MG tablet Take 1 tablet by mouth Daily As Needed for Nausea or Vomiting. 21  Lynette Chand, MAUREEN     PHYSICAL EXAM  Ht 167.6 cm (66\")   Wt 97.5 kg (215 lb)   LMP 05/10/2021 (Within Days)   Breastfeeding No   BMI 34.70 kg/m²   General: No acute distress. Well developed, well nourished. Pleasant.  Heart: Regular rate and rhythm.   Lungs: Clear to auscultation bilaterally. No increased work of breathing  Abdomen: Soft, nontender to palpation, enlarged by gravid uterus.    NST Review  Baseline 150-155, +accels, moderate variability (periods of minimal variability resolve), no decels, ctx coupled at times, q1-2 mins on admission    SVE:   3.5/  /-1 on admission (previously 0.5 cm on )    IMPRESSION  Jayashree Roberts is a 26 y.o.  at 40w0d admitted with term labor.    PLAN  1.  IUP at 40w0d with term labor  - Admit: Labor and Delivery  - Attending: Dr. Ambriz  - Condition: Stable  - Vitals: per protocol  - Activity: ad sonia  - Nursing: Continuous electronic fetal monitoring, as per protocol  - Diet: Clears  - IV fluids:  mL/hr  - Meds: prn tylenol, zofran, stadol  - Allergies:   Allergies   Allergen Reactions   • Azithromycin Rash   • Penicillins Rash   - Labs: CBC, T&S, UDS  - GBS: negative.  Antibiotics:  Not indicated  - " Jayashree Roberts and I have discussed pain goals for this hospitalization after reviewing her current clinical condition, medical history and prior pain experiences.  The goal is to keep her pain level manageable.  Patient does desire an epidural  - Anticipate   - Planning for baby alexandra Goodwin        This document has been electronically signed by Lori Ambriz DO on 2022 03:42 CST

## 2022-02-14 NOTE — ANESTHESIA PREPROCEDURE EVALUATION
Anesthesia Evaluation     Patient summary reviewed and Nursing notes reviewed   NPO Solid Status: > 4 hours  NPO Liquid Status: < 2 hours           Airway   Mallampati: II  No difficulty expected  Dental - normal exam     Pulmonary - normal exam   (+) a smoker Former, asthma,  Cardiovascular - negative cardio ROS    Rhythm: regular  Rate: normal        Neuro/Psych  (+) psychiatric history Anxiety,    GI/Hepatic/Renal/Endo - negative ROS     Musculoskeletal (-) negative ROS    Abdominal    Substance History      OB/GYN    (+) Pregnant,         Other - negative ROS                       Anesthesia Plan    ASA 2     epidural       Anesthetic plan, all risks, benefits, and alternatives have been provided, discussed and informed consent has been obtained with: patient and mother.    Plan discussed with CRNA.        CODE STATUS:

## 2022-02-14 NOTE — ANESTHESIA PROCEDURE NOTES
Labor Epidural      Patient reassessed immediately prior to procedure    Patient location during procedure: OB  Start Time: 2/14/2022 4:00 AM  Indication:at surgeon's request  Performed By  CRNA: Tyrone Miranda CRNA  Preanesthetic Checklist  Completed: patient identified, IV checked, site marked, risks and benefits discussed, surgical consent, monitors and equipment checked, pre-op evaluation and timeout performed  Prep:  Pt Position:sitting  Sterile Tech:cap, gloves, mask and sterile barrier  Prep:chlorhexidine gluconate and isopropyl alcohol  Monitoring:blood pressure monitoring and continuous pulse oximetry  Epidural Block Procedure:  Approach:midline  Guidance:landmark technique  Location:L4-L5  Needle Type:Tuohy  Needle Gauge:17 G  Loss of Resistance Medium: saline  Loss of Resistance: 7cm  Cath Depth at skin:12 cm  Paresthesia: none  Aspiration:negative  Test Dose:negative  Number of Attempts: 1  Post Assessment:  Dressing:occlusive dressing applied and secured with tape  Pt Tolerance:patient tolerated the procedure well with no apparent complications  Complications:no

## 2022-02-15 PROCEDURE — 99232 SBSQ HOSP IP/OBS MODERATE 35: CPT | Performed by: STUDENT IN AN ORGANIZED HEALTH CARE EDUCATION/TRAINING PROGRAM

## 2022-02-15 RX ORDER — ACETAMINOPHEN 325 MG/1
650 TABLET ORAL EVERY 6 HOURS PRN
Qty: 100 TABLET | Refills: 1 | Status: SHIPPED | OUTPATIENT
Start: 2022-02-15

## 2022-02-15 RX ORDER — PSEUDOEPHEDRINE HCL 30 MG
100 TABLET ORAL 2 TIMES DAILY
Qty: 60 CAPSULE | Refills: 1 | Status: SHIPPED | OUTPATIENT
Start: 2022-02-15

## 2022-02-15 RX ORDER — IBUPROFEN 800 MG/1
800 TABLET ORAL EVERY 8 HOURS
Qty: 40 TABLET | Refills: 1 | Status: SHIPPED | OUTPATIENT
Start: 2022-02-15

## 2022-02-15 RX ADMIN — PRENATAL VIT W/ FE FUMARATE-FA TAB 27-0.8 MG 1 TABLET: 27-0.8 TAB at 08:41

## 2022-02-15 RX ADMIN — DOCUSATE SODIUM 100 MG: 100 CAPSULE, LIQUID FILLED ORAL at 08:41

## 2022-02-15 RX ADMIN — IBUPROFEN 800 MG: 800 TABLET, FILM COATED ORAL at 05:24

## 2022-02-15 RX ADMIN — DOCUSATE SODIUM 100 MG: 100 CAPSULE, LIQUID FILLED ORAL at 21:10

## 2022-02-15 RX ADMIN — ACETAMINOPHEN 1000 MG: 500 TABLET, FILM COATED ORAL at 11:17

## 2022-02-15 RX ADMIN — ACETAMINOPHEN 1000 MG: 500 TABLET, FILM COATED ORAL at 00:42

## 2022-02-15 RX ADMIN — ACETAMINOPHEN 1000 MG: 500 TABLET, FILM COATED ORAL at 17:38

## 2022-02-15 RX ADMIN — FERROUS SULFATE TAB EC 324 MG (65 MG FE EQUIVALENT) 324 MG: 324 (65 FE) TABLET DELAYED RESPONSE at 08:41

## 2022-02-15 RX ADMIN — IBUPROFEN 800 MG: 800 TABLET, FILM COATED ORAL at 15:05

## 2022-02-15 RX ADMIN — FERROUS SULFATE TAB EC 324 MG (65 MG FE EQUIVALENT) 324 MG: 324 (65 FE) TABLET DELAYED RESPONSE at 17:38

## 2022-02-15 RX ADMIN — ACETAMINOPHEN 1000 MG: 500 TABLET, FILM COATED ORAL at 05:24

## 2022-02-15 RX ADMIN — IBUPROFEN 800 MG: 800 TABLET, FILM COATED ORAL at 21:10

## 2022-02-15 NOTE — PLAN OF CARE
Problem: Adult Inpatient Plan of Care  Goal: Plan of Care Review  Outcome: Ongoing, Progressing  Flowsheets  Taken 2/15/2022 0602 by Enedina Santiago, RN  Outcome Summary: VSS, bleeding light, ff-2, voiding well. tolerating po, breastfeeding going well, encouragment gieven  Taken 2/14/2022 1532 by Katty Campbell RN  Plan of Care Reviewed With: patient   Goal Outcome Evaluation:              Outcome Summary: VSS, bleeding light, ff-2, voiding well. tolerating po, breastfeeding going well, encouragment gieven

## 2022-02-15 NOTE — PAYOR COMM NOTE
"Suly Wilson  Case Managment Extender  (P) [phone] (F) [phone]    REF#[id number]  Jayashree Roberts (26 y.o. Female)             Date of Birth Social Security Number Address Home Phone MRN    1995  [address]  [place] [address] [phone] [id number]    Synagogue Marital Status             None Single       Admission Date Admission Type Admitting Provider Attending Provider Department, Room/Bed    22 Elective Lori Ambriz DO Smith, Amelia, DO [facility] MOTHER BABY, M759/1    Discharge Date Discharge Disposition Discharge Destination                         Attending Provider: Lori Ambriz DO    Allergies: Azithromycin, Penicillins    Isolation: None   Infection: None   Code Status: CPR   Advance Care Planning Activity    Ht: 167.6 cm (66\")   Wt: 97.5 kg (215 lb)    Admission Cmt: None   Principal Problem: None                Active Insurance as of 2022     Primary Coverage     Payor Plan Insurance Group Employer/Plan Group    ANTHEM MEDICAID ANTH MEDICAID [id number]     Payor Plan Address Payor Plan Phone Number Payor Plan Fax Number Effective Dates    [address] [address] [phone]  2020 - None Entered    [facility] [id number]       Subscriber Name Subscriber Birth Date Member ID       JAYASHREE ROBERTS 1995 [id number]                 Emergency Contacts      (Rel.) Home Phone Work Phone Mobile Phone    ethel macias (Significant Other) [phone] -- --               History & Physical      Lori Ambriz DO at 22 0342          HISTORY & PHYSICAL - Obstetrics  [facility]    Name: Jayashree Roberts  MRN: [id number]  Location:   Date: 2022  CSN: [id number]      CHIEF COMPLAINT:  Term labor    HISTORY OF PRESENT ILLNESS  Jayashree Roberts is a 26 y.o.  at 40w0d gestational age by LMP = 10 week 1st TRI US suggesting Estimated Date of Delivery: 22.  The " patient presents with a chief complaint of increasing contractions since Saturday, worsening since 2130 last evening.  Denies LOF.  Scant vaginal bleeding with loss of mucous plug.  Regular contractions.  Good FM.    Patient denies any chest pain, palpitations, headaches, lightheadedness, shortness of breath, cough, nausea, vomiting, diarrhea, constipation, fever, or chills.    ROS  Review of Systems   Constitutional: Negative.    HENT: Negative.    Respiratory: Negative.    Cardiovascular: Negative.    Gastrointestinal: Negative.    Genitourinary: Negative.    Musculoskeletal: Negative.    Skin: Negative.    Psychiatric/Behavioral: Negative.        PRENATAL LAB RESULTS  Prenatal labs reviewed  External Prenatal Results     Pregnancy Outside Results - Transcribed From Office Records - See Scanned Records For Details     Test Value Date Time    ABO  A  02/14/22 0012    Rh  Positive  02/14/22 0012    Antibody Screen  Negative  02/14/22 0012       Negative  06/23/21 1406    Varicella IgG       Rubella  1.18 index 06/23/21 1406    Hgb  10.2 g/dL 02/14/22 0012       10.7 g/dL 02/06/22 1321       10.6 g/dL 11/23/21 0959       12.3 g/dL 09/28/21 1211       13.5 g/dL 06/23/21 1406    Hct  30.9 % 02/14/22 0012       32.0 % 02/06/22 1321       31.2 % 11/23/21 0959       37.2 % 09/28/21 1211       39.4 % 06/23/21 1406    Glucose Fasting GTT       Glucose Tolerance Test 1 hour       Glucose Tolerance Test 3 hour  102 mg/dL 12/02/21 0854    Gonorrhea (discrete)  Negative  06/23/21 1406    Chlamydia (discrete)  Negative  06/23/21 1406    RPR  Non-Reactive  06/23/21 1406    VDRL       Syphilis Antibody       HBsAg  Non-Reactive  06/23/21 1406    Herpes Simplex Virus PCR       Herpes Simplex VIrus Culture       HIV  Non-Reactive  06/23/21 1406    Hep C RNA Quant PCR       Hep C Antibody  Non-Reactive  06/23/21 1406    AFP       Group B Strep  Negative  01/31/22 0949    GBS Susceptibility to Clindamycin       GBS Susceptibility to  Erythromycin       Fetal Fibronectin       Genetic Testing, Maternal Blood             Drug Screening     Test Value Date Time    Urine Drug Screen       Amphetamine Screen  Negative  02/14/22 0012       Negative  06/23/21 1406    Barbiturate Screen  Negative  02/14/22 0012       Negative  06/23/21 1406    Benzodiazepine Screen  Negative  02/14/22 0012       Negative  06/23/21 1406    Methadone Screen  Negative  02/14/22 0012       Negative  06/23/21 1406    Phencyclidine Screen  Negative  02/14/22 0012       Negative  06/23/21 1406    Opiates Screen  Negative  02/14/22 0012       Negative  06/23/21 1406    THC Screen  Negative  02/14/22 0012       Positive  06/23/21 1406    Cocaine Screen       Propoxyphene Screen  Negative  02/14/22 0012       Negative  06/23/21 1406    Buprenorphine Screen  Negative  02/14/22 0012       Negative  06/23/21 1406    Methamphetamine Screen       Oxycodone Screen  Negative  02/14/22 0012       Negative  06/23/21 1406    Tricyclic Antidepressants Screen  Negative  02/14/22 0012       Negative  06/23/21 1406          Legend    ^: Historical                        PRENATAL RISK FACTORS  6/21 Problems (from 06/23/21 to present)     Problem Noted Resolved    Tetanus, diphtheria, and acellular pertussis (Tdap) vaccination declined 12/14/2021 by Shy Paris MD No    Influenza vaccination declined by patient 12/14/2021 by Shy Paris MD No    Marijuana use 10/29/2021 by Lori Ambriz DO No    Overview Signed 10/29/2021 11:04 PM by Lori Ambriz DO     +THC IOB, denies current use         Encounter for supervision of normal first pregnancy in third trimester 7/20/2021 by Shy Paris MD No    Overview Addendum 12/2/2021  1:23 PM by Shy Paris MD     A POS/ Rubella immune/ GBS @ 36wks  Dating: LMP  Tdap: declined 11/23/21  Flu: declined 11/23/21  Anatomy: wnl  1h Glucola: elevated; 3HR pass  Lab Results   Component Value Date    HGB 13.5 06/23/2021     HCT 39.4 2021     2021     Feeding Method: undecided, considering both  BC plan: ? Gave [email or web address] info 10/27           Previous Version          DATING SUMMARY  LMP (05/10/2021)-- Estimated Date of Delivery: 22 by LMP c/w 1TUS (21 at 10w5d)    OB HISTORY  OB History    Para Term  AB Living   1             SAB IAB Ectopic Molar Multiple Live Births                    # Outcome Date GA Lbr Dev/2nd Weight Sex Delivery Anes PTL Lv   1 Current              GYN HISTORY  Denies h/o sexually transmitted infections/pelvic inflammatory disease  Denies h/o abnormal pap smears, last pap was 2019 in [place], recommend repeat PP  Denies h/o gynecologic surgeries, including biopsies of the cervix    PAST MEDICAL HISTORY  Past Medical History:   Diagnosis Date   • Asthma    • History of depression    • Marijuana smoker    • Substance abuse (HCC)    • Varicella      PAST SURGICAL HISTORY  Past Surgical History:   Procedure Laterality Date   • ADENOIDECTOMY     • TONSILLECTOMY       FAMILY HISTORY  Family History   Problem Relation Age of Onset   • Thyroid disease Father    • Diabetes Mother    • Thyroid disease Sister    • Diabetes Maternal Grandmother    • No Known Problems Paternal Grandmother      SOCIAL HISTORY  Social History     Socioeconomic History   • Marital status: Single   • Highest education level: GED or equivalent   Tobacco Use   • Smoking status: Former Smoker   • Smokeless tobacco: Never Used   • Tobacco comment: quit with pregnancy   Vaping Use   • Vaping Use: Never used   Substance and Sexual Activity   • Alcohol use: Not Currently   • Drug use: Not Currently     Types: Marijuana   • Sexual activity: Yes     Partners: Male     Comment: last pap smear 2019 in  [place]     ALLERGIES  Allergies   Allergen Reactions   • Azithromycin Rash   • Penicillins Rash     HOME MEDICATIONS  Prior to Admission medications    Medication Sig Start Date End Date Taking? Authorizing  "Provider   acetaminophen (TYLENOL) 325 MG tablet Take 650 mg by mouth Every 6 (Six) Hours As Needed for Mild Pain .   Yes Provider, Historical, MD   Prenatal Vit-Fe Fumarate-FA (prenatal vitamin 27-0.8) 27-0.8 MG tablet tablet Take  by mouth Daily.   Yes Provider, Jaylyn, MD   ondansetron (Zofran) 4 MG tablet Take 1 tablet by mouth Daily As Needed for Nausea or Vomiting. 21  Lynette Chand, MAUREEN     PHYSICAL EXAM  Ht 167.6 cm (66\")   Wt 97.5 kg (215 lb)   LMP 05/10/2021 (Within Days)   Breastfeeding No   BMI 34.70 kg/m²   General: No acute distress. Well developed, well nourished. Pleasant.  Heart: Regular rate and rhythm.   Lungs: Clear to auscultation bilaterally. No increased work of breathing  Abdomen: Soft, nontender to palpation, enlarged by gravid uterus.    NST Review  Baseline 150-155, +accels, moderate variability (periods of minimal variability resolve), no decels, ctx coupled at times, q1-2 mins on admission    SVE:   3.5/  /-1 on admission (previously 0.5 cm on )    IMPRESSION  Jayashree Roberts is a 26 y.o.  at 40w0d admitted with term labor.    PLAN  1.  IUP at 40w0d with term labor  - Admit: Labor and Delivery  - Attending: Dr. Ambriz  - Condition: Stable  - Vitals: per protocol  - Activity: ad snoia  - Nursing: Continuous electronic fetal monitoring, as per protocol  - Diet: Clears  - IV fluids:  mL/hr  - Meds: prn tylenol, zofran, stadol  - Allergies:   Allergies   Allergen Reactions   • Azithromycin Rash   • Penicillins Rash   - Labs: CBC, T&S, UDS  - GBS: negative.  Antibiotics:  Not indicated  - Jayashree Roberts and I have discussed pain goals for this hospitalization after reviewing her current clinical condition, medical history and prior pain experiences.  The goal is to keep her pain level manageable.  Patient does desire an epidural  - Anticipate   - Planning for baby alexandra Goodwin        This document has been electronically signed by Lori " DO Pb on 2022 03:42 CST      Electronically signed by Lori Ambriz DO at 22 0446         Lab Results (last 24 hours)     ** No results found for the last 24 hours. **        Imaging Results (Last 24 Hours)     ** No results found for the last 24 hours. **        ECG/EMG Results (last 24 hours)     ** No results found for the last 24 hours. **           Physician Progress Notes (last 24 hours)      Lori Ambriz DO at 02/15/22 0719          [facility] - Vaginal Delivery Progress Note  Hospital Day: 2  Subjective:     The patient feels well. Pain is well controlled with current medications. The baby is well and in the room with parents. The patient is ambulating well. The patient is tolerating a normal diet. Lochia mild flow at this point.   Feeding method: Breastfeed.  Birth control plan: Undecided     Meds reviewed  ROS reviewed and otherwise negative     Prenatal lab:  Lab Results   Component Value Date    RUBELLAABIGG 1.18 2021    ABO A 2022    RH Positive 2022       Objective:  Temp:  [97.1 °F (36.2 °C)-100.1 °F (37.8 °C)] 97.5 °F (36.4 °C)  Heart Rate:  [] 97  Resp:  [18-20] 18  BP: (0-144)/(0-91) 93/51    General: alert, well appearing, in no apparent distress  Chest: no increased work of breathing  Lochia: appropriate  Uterine Fundus: firm below umbilicus, abdomen nontender, mildly distended  Ext: no cyanosis, trace bilateral lower extremity edema, nontender    Labs:  Lab Results   Component Value Date    WBC 12.38 (H) 2022    HGB 10.2 (L) 2022    HCT 30.9 (L) 2022    MCV 81.7 2022     2022    RH Positive 2022    HEPBSAG Non-Reactive 2021   ?     Assessment:  Active Hospital Problems    Diagnosis  POA   •  (normal spontaneous vaginal delivery) [O80]  Not Applicable   • Normal labor [O80, Z37.9]  Not Applicable      Resolved Hospital Problems   No resolved problems to display.        26 y.o.  40w0d with Estimated Date of Delivery: 22 s/p  Vaginal, Spontaneous  PPD#1.Doing well    Plan:   Continue current care     Signature      This document has been electronically signed by Bert Bowen MD on February 15, 2022 07:23 CST       I personally saw and examined the patient with Dr. Bowen and was present during the key portion of the E/M service. I agree with the history and exam as documented by the resident. 25 yo  at 40+0 PPD#1 s/p . Discussed keeping patient until PPD#2 as this is her first child and she is breastfeeding. They report breastfeeding going well. They are interested in possible DC home today. Her mood is good, tolerating po, VB appropriate, voiding and had BM. Okay with DC home if infant released by Peds. Will folllowup this afternoon. Otherwise, I agree with the assessment and plan as outlined above.        This document has been electronically signed by Lori Ambriz DO on February 15, 2022 08:25 CST              Electronically signed by Lori Ambriz DO at 02/15/22 0800       Medical Student Notes (last 24 hours)  Notes from 22 110 through 02/15/22 110   No notes of this type exist for this encounter.         Consult Notes (last 24 hours)  Notes from 22 1102 through 02/15/22 110   No notes of this type exist for this encounter.

## 2022-02-15 NOTE — PLAN OF CARE
Problem: Adult Inpatient Plan of Care  Goal: Plan of Care Review  Outcome: Ongoing, Progressing  Flowsheets  Taken 2/15/2022 1530  Progress: improving  Outcome Summary: vss, bleeding light, ff, ambulating to restroom, breastfeeding every 2-3 hrs. Voiding  Taken 2022 1532  Plan of Care Reviewed With: patient  Goal: Patient-Specific Goal (Individualized)  Outcome: Ongoing, Progressing  Goal: Absence of Hospital-Acquired Illness or Injury  Outcome: Ongoing, Progressing  Intervention: Identify and Manage Fall Risk  Recent Flowsheet Documentation  Taken 2/15/2022 1505 by Katty Campbell RN  Safety Promotion/Fall Prevention:   safety round/check completed   assistive device/personal items within reach   clutter free environment maintained  Taken 2/15/2022 1430 by Katty Campbell RN  Safety Promotion/Fall Prevention:   safety round/check completed   assistive device/personal items within reach   clutter free environment maintained  Taken 2/15/2022 1328 by Katty Campbell RN  Safety Promotion/Fall Prevention:   safety round/check completed   assistive device/personal items within reach   clutter free environment maintained  Goal: Optimal Comfort and Wellbeing  Outcome: Ongoing, Progressing  Goal: Readiness for Transition of Care  Outcome: Ongoing, Progressing     Problem:  Fall Injury Risk  Goal: Absence of Fall, Infant Drop and Related Injury  Outcome: Ongoing, Progressing  Intervention: Promote Injury-Free Environment  Recent Flowsheet Documentation  Taken 2/15/2022 1505 by Katty Campbell RN  Safety Promotion/Fall Prevention:   safety round/check completed   assistive device/personal items within reach   clutter free environment maintained  Taken 2/15/2022 1430 by Katty Campbell RN  Safety Promotion/Fall Prevention:   safety round/check completed   assistive device/personal items within reach   clutter free environment maintained  Taken 2/15/2022 1328 by Katty Campbell  ABHINAV, RN  Safety Promotion/Fall Prevention:   safety round/check completed   assistive device/personal items within reach   clutter free environment maintained     Problem: Adjustment to Role Transition (Postpartum Vaginal Delivery)  Goal: Successful Maternal Role Transition  Outcome: Ongoing, Progressing     Problem: Bleeding (Postpartum Vaginal Delivery)  Goal: Hemostasis  Outcome: Ongoing, Progressing     Problem: Infection (Postpartum Vaginal Delivery)  Goal: Absence of Infection Signs and Symptoms  Outcome: Ongoing, Progressing     Problem: Pain (Postpartum Vaginal Delivery)  Goal: Acceptable Pain Control  Outcome: Ongoing, Progressing     Problem: Urinary Retention (Postpartum Vaginal Delivery)  Goal: Effective Urinary Elimination  Outcome: Ongoing, Progressing     Problem: Breastfeeding  Goal: Effective Breastfeeding  Outcome: Ongoing, Progressing   Goal Outcome Evaluation:  Plan of Care Reviewed With: patient        Progress: improving  Outcome Summary: vss, bleeding light, ff, ambulating to restroom, breastfeeding every 2-3 hrs. Voiding

## 2022-02-15 NOTE — PROGRESS NOTES
[facility] - Vaginal Delivery Progress Note  Hospital Day: 2  Subjective:     The patient feels well. Pain is well controlled with current medications. The baby is well and in the room with parents. The patient is ambulating well. The patient is tolerating a normal diet. Lochia mild flow at this point.   Feeding method: Breastfeed.  Birth control plan: Undecided     Meds reviewed  ROS reviewed and otherwise negative     Prenatal lab:  Lab Results   Component Value Date    RUBELLAABIGG 1.18 2021    ABO A 2022    RH Positive 2022       Objective:  Temp:  [97.1 °F (36.2 °C)-100.1 °F (37.8 °C)] 97.5 °F (36.4 °C)  Heart Rate:  [] 97  Resp:  [18-20] 18  BP: (0-144)/(0-91) 93/51    General: alert, well appearing, in no apparent distress  Chest: no increased work of breathing  Lochia: appropriate  Uterine Fundus: firm below umbilicus, abdomen nontender, mildly distended  Ext: no cyanosis, trace bilateral lower extremity edema, nontender    Labs:  Lab Results   Component Value Date    WBC 12.38 (H) 2022    HGB 10.2 (L) 2022    HCT 30.9 (L) 2022    MCV 81.7 2022     2022    RH Positive 2022    HEPBSAG Non-Reactive 2021   ?     Assessment:  Active Hospital Problems    Diagnosis  POA   •  (normal spontaneous vaginal delivery) [O80]  Not Applicable   • Normal labor [O80, Z37.9]  Not Applicable      Resolved Hospital Problems   No resolved problems to display.       26 y.o.  40w0d with Estimated Date of Delivery: 22 s/p  Vaginal, Spontaneous  PPD#1.Doing well    Plan:   Continue current care     Signature      This document has been electronically signed by Bert Bowen MD on February 15, 2022 07:23 CST       I personally saw and examined the patient with Dr. Bowen and was present during the key portion of the E/M service. I agree with the history and exam as documented by the resident. 27 yo  at 40+0  PPD#1 s/p . Discussed keeping patient until PPD#2 as this is her first child and she is breastfeeding. They report breastfeeding going well. They are interested in possible DC home today. Her mood is good, tolerating po, VB appropriate, voiding and had BM. Okay with DC home if infant released by Peds. Will folllowup this afternoon. Otherwise, I agree with the assessment and plan as outlined above.        This document has been electronically signed by Lori Ambriz DO on February 15, 2022 08:25 CST

## 2022-02-15 NOTE — DISCHARGE SUMMARY
[facility]  Discharge Summary  Patient Name: Jayashree Roberts  : 1995  MRN: [id number]  CSN: [id number]    Discharge Summary    Date of Admission: 2022   Date of Discharge: 2022    Principle Discharge Dx: Active Hospital Problems    Diagnosis  POA   •  (normal spontaneous vaginal delivery) [O80]  Not Applicable   • Normal labor [O80, Z37.9]  Not Applicable      Procedures Performed:    Brief History: Patient is a 26 y.o. now  who presented to labor and delivery in labor at term.   Hospital Course: Patient presented in latent labor and progressed spontaneously.  She had an uncomplicated .  Her postpartum course was unremarkable.  On PPD #2 she expressed the desire for discharge.  She had passed gas, had BM, and was urinating normally.  She was eating a regular diet without difficulty.  She was ambulating well. Breastfeeding going well. Declined birth control at this time. Mood good. Initially desired DC PPD#1, but infant had to stay for repeat labs and desires DC home today. Discharge instructions were given.  All questions were answered   Condition:  Discharge Activity:  Discharge Diet: Stable  Activity Instructions     Bathing Restrictions      Type of Restriction: Bathing    Bathing Restrictions: Other    Explain Bathing Restrictions: No soaking in bathtub for 4 weeks. Showers are fine.    Driving Restrictions      Type of Restriction: Driving    Driving Restrictions: No Driving (Time Limited)    Length: Other    Indicate Length of Restriction: No driving for 1 week or if using narcotic pain medications. Riding is car is fine.    Lifting Restrictions      Type of Restriction: Lifting    Lifting Restrictions: Other    Explain Lifing Restrictions: No lifting more than infant and baby carrier together for 6 weeks.    Pelvic Rest      Nothing in the vagina for 6 weeks to include tampons, intercourse, or douching.    Sexual Activity Restrictions      Type of  Restriction: Sex    Explain Sexual Activity Restrictions: No sexual intercourse for at least 6 weeks        Regular   Discharge Medications:    Your medication list      START taking these medications      Instructions Last Dose Given Next Dose Due   docusate sodium 100 MG capsule      Take 1 capsule by mouth 2 (Two) Times a Day.       ibuprofen 800 MG tablet  Commonly known as: ADVIL,MOTRIN      Take 1 tablet by mouth Every 8 (Eight) Hours.          CONTINUE taking these medications      Instructions Last Dose Given Next Dose Due   acetaminophen 325 MG tablet  Commonly known as: TYLENOL      Take 2 tablets by mouth Every 6 (Six) Hours As Needed for Mild Pain.       ondansetron 4 MG tablet  Commonly known as: Zofran      Take 1 tablet by mouth Daily As Needed for Nausea or Vomiting.       prenatal vitamin 27-0.8 27-0.8 MG tablet tablet      Take  by mouth Daily.             Where to Get Your Medications      These medications were sent to [facility]  [address] [place] [address]    Hours: Monday through Friday 7:00am to 5:00pm Phone: [phone] ·   acetaminophen 325 MG tablet  · docusate sodium 100 MG capsule  · ibuprofen 800 MG tablet        Discharge Disposition: Home   Follow-up: No future appointments.  2 week PP visit (telephone)  6 week PP visit     <30 minutes was spent with the patient on the day of discharge.      This document has been electronically signed by Lori Ambriz DO on February 15, 2022 12:58 CST

## 2022-02-16 VITALS
RESPIRATION RATE: 18 BRPM | WEIGHT: 215 LBS | OXYGEN SATURATION: 96 % | HEIGHT: 66 IN | BODY MASS INDEX: 34.55 KG/M2 | DIASTOLIC BLOOD PRESSURE: 79 MMHG | HEART RATE: 95 BPM | SYSTOLIC BLOOD PRESSURE: 135 MMHG | TEMPERATURE: 98 F

## 2022-02-16 PROCEDURE — 99238 HOSP IP/OBS DSCHRG MGMT 30/<: CPT | Performed by: STUDENT IN AN ORGANIZED HEALTH CARE EDUCATION/TRAINING PROGRAM

## 2022-02-16 RX ADMIN — FERROUS SULFATE TAB EC 324 MG (65 MG FE EQUIVALENT) 324 MG: 324 (65 FE) TABLET DELAYED RESPONSE at 11:09

## 2022-02-16 RX ADMIN — ACETAMINOPHEN 1000 MG: 500 TABLET, FILM COATED ORAL at 04:12

## 2022-02-16 RX ADMIN — IBUPROFEN 800 MG: 800 TABLET, FILM COATED ORAL at 05:11

## 2022-02-16 RX ADMIN — PRENATAL VIT W/ FE FUMARATE-FA TAB 27-0.8 MG 1 TABLET: 27-0.8 TAB at 11:10

## 2022-02-16 RX ADMIN — DOCUSATE SODIUM 100 MG: 100 CAPSULE, LIQUID FILLED ORAL at 11:09

## 2022-02-16 RX ADMIN — ACETAMINOPHEN 1000 MG: 500 TABLET, FILM COATED ORAL at 11:09

## 2022-02-16 NOTE — PROGRESS NOTES
[facility] - Vaginal Delivery Progress Note  Hospital Day: 3  Subjective:     The patient feels well. Pain is well controlled with current medications. The baby is well and in the room with parents. The patient is ambulating well. The patient is tolerating a normal diet. Lochia clotting has decreased but patient has noticed a small amount of vaginal bleeding slightly increased from yesterday.  Feeding method: Breastfeed.  Birth control plan: Undecided at this time.     Meds reviewed  ROS reviewed and otherwise negative     Objective:  Temp:  [97.5 °F (36.4 °C)-98.2 °F (36.8 °C)] 98.1 °F (36.7 °C)  Heart Rate:  [] 92  Resp:  [18] 18  BP: (105-129)/(63-81) 105/63    General: alert, well appearing, in no apparent distress  Chest: no increased work of breathing  Uterine Fundus: firm and below umbilicus, appropriately tender abdomen, mildly distended  Lower extremities: Trace nonpitting edema bilaterally, improved from yesterday. Nontender.  Labs:  Lab Results   Component Value Date    WBC 12.38 (H) 2022    HGB 10.2 (L) 2022    HCT 30.9 (L) 2022    MCV 81.7 2022     2022    RH Positive 2022    HEPBSAG Non-Reactive 2021   ?     Assessment:  Active Hospital Problems    Diagnosis  POA   •  (normal spontaneous vaginal delivery) [O80]  Not Applicable   • Normal labor [O80, Z37.9]  Not Applicable      Resolved Hospital Problems   No resolved problems to display.       26 y.o.  40w0d with Estimated Date of Delivery: 22 s/p  Vaginal, Spontaneous  PPD#2 .Doing well  Plan:   Continue current care         This document has been electronically signed by Bert Bowen MD on 2022 06:42 CST    I personally saw and examined the patient with Dr. Bowen and was present during the key portion of the E/M service. I agree with the history and exam as documented by the resident. 25 yo  at 40+0 PPD#2 s/p . Infant cried a lot last  night. Still working on breastfeeding and pumping. Plan to meet with lactation again today. Plan for DC home later today. Her mood is good, tolerating po, VB appropriate, voiding and had BM. Okay with DC home. Otherwise, I agree with the assessment and plan as outlined above.        This document has been electronically signed by Lori Ambriz DO on February 16, 2022 07:34 CST

## 2022-02-16 NOTE — PLAN OF CARE
Goal Outcome Evaluation:  Plan of Care Reviewed With: patient        Progress: improving  Outcome Summary: VSS, Ff, lochia light, voids, had a bowel movement, ambulated in the room. Breasfeeding and pumping in progress

## 2022-02-17 NOTE — PAYOR COMM NOTE
"Estephania Villasenormore  [facility]  Case Management Extender  [phone] phone  [phone] fax      Ref# [id number]    Jayashree Roberts (26 y.o. Female)             Date of Birth Social Security Number Address Home Phone MRN    1995  [address]  [place] [address] [phone] [id number]    Christian Marital Status             None Single       Admission Date Admission Type Admitting Provider Attending Provider Department, Room/Bed    22 Elective Lori Ambriz DO  [facility] MOTHER BABY, M759/1    Discharge Date Discharge Disposition Discharge Destination          2022 Home or Self Care              Attending Provider: (none)   Allergies: Azithromycin, Penicillins    Isolation: None   Infection: None   Code Status: Prior   Advance Care Planning Activity    Ht: 167.6 cm (66\")   Wt: 97.5 kg (215 lb)    Admission Cmt: None   Principal Problem: None                Active Insurance as of 2022     Primary Coverage     Payor Plan Insurance Group Employer/Plan Group    ANTH MEDICAID [facility] MEDICAID [id number]     Payor Plan Address Payor Plan Phone Number Payor Plan Fax Number Effective Dates    [address] [address] [phone]  2020 - None Entered    [facility] [id number]       Subscriber Name Subscriber Birth Date Member ID       JAYASHREE ROBERTS 1995 [id number]                 Emergency Contacts      (Rel.) Home Phone Work Phone Mobile Phone    ethel macias (Significant Other) [phone] -- --               Discharge Summary      Lori Ambriz DO at 22 0731          [facility]  Discharge Summary  Patient Name: Jayashree Roberts  : 1995  MRN: [id number]  CSN: [id number]    Discharge Summary    Date of Admission: 2022   Date of Discharge: 2022    Principle Discharge Dx: Active Hospital Problems    Diagnosis  POA   •  (normal spontaneous vaginal " delivery) [O80]  Not Applicable   • Normal labor [O80, Z37.9]  Not Applicable      Procedures Performed:    Brief History: Patient is a 26 y.o. now  who presented to labor and delivery in labor at term.   Hospital Course: Patient presented in latent labor and progressed spontaneously.  She had an uncomplicated .  Her postpartum course was unremarkable.  On PPD #2 she expressed the desire for discharge.  She had passed gas, had BM, and was urinating normally.  She was eating a regular diet without difficulty.  She was ambulating well. Breastfeeding going well. Declined birth control at this time. Mood good. Initially desired DC PPD#1, but infant had to stay for repeat labs and desires DC home today. Discharge instructions were given.  All questions were answered   Condition:  Discharge Activity:  Discharge Diet: Stable  Activity Instructions     Bathing Restrictions      Type of Restriction: Bathing    Bathing Restrictions: Other    Explain Bathing Restrictions: No soaking in bathtub for 4 weeks. Showers are fine.    Driving Restrictions      Type of Restriction: Driving    Driving Restrictions: No Driving (Time Limited)    Length: Other    Indicate Length of Restriction: No driving for 1 week or if using narcotic pain medications. Riding is car is fine.    Lifting Restrictions      Type of Restriction: Lifting    Lifting Restrictions: Other    Explain Lifing Restrictions: No lifting more than infant and baby carrier together for 6 weeks.    Pelvic Rest      Nothing in the vagina for 6 weeks to include tampons, intercourse, or douching.    Sexual Activity Restrictions      Type of Restriction: Sex    Explain Sexual Activity Restrictions: No sexual intercourse for at least 6 weeks        Regular   Discharge Medications:    Your medication list      START taking these medications      Instructions Last Dose Given Next Dose Due   docusate sodium 100 MG capsule      Take 1 capsule by mouth 2 (Two) Times a  Day.       ibuprofen 800 MG tablet  Commonly known as: ADVIL,MOTRIN      Take 1 tablet by mouth Every 8 (Eight) Hours.          CONTINUE taking these medications      Instructions Last Dose Given Next Dose Due   acetaminophen 325 MG tablet  Commonly known as: TYLENOL      Take 2 tablets by mouth Every 6 (Six) Hours As Needed for Mild Pain.       ondansetron 4 MG tablet  Commonly known as: Zofran      Take 1 tablet by mouth Daily As Needed for Nausea or Vomiting.       prenatal vitamin 27-0.8 27-0.8 MG tablet tablet      Take  by mouth Daily.             Where to Get Your Medications      These medications were sent to [facility]  [address] [place] [address]    Hours: Monday through Friday 7:00am to 5:00pm Phone: [phone] ·   acetaminophen 325 MG tablet  · docusate sodium 100 MG capsule  · ibuprofen 800 MG tablet        Discharge Disposition: Home   Follow-up: No future appointments.  2 week PP visit (telephone)  6 week PP visit     <30 minutes was spent with the patient on the day of discharge.      This document has been electronically signed by Lori Ambriz DO on February 15, 2022 12:58 CST      Electronically signed by Lori Ambriz DO at 02/16/22 0731       Discharge Order (From admission, onward)     Start     Ordered    02/16/22 1226  Discharge patient  Once        Expected Discharge Date: 02/16/22    Discharge Disposition: Home or Self Care    Physician of Record for Attribution - Please select from Treatment Team: HARSHA MARTINEZ [[id number]]    Review needed by CMO to determine Physician of Record: No       Question Answer Comment   Physician of Record for Attribution - Please select from Treatment Team HARSHA MARTINEZ    Review needed by CMO to determine Physician of Record No        02/16/22 1225    02/15/22 1257  Discharge patient  Once,   Status:  Canceled        Expected Discharge Date: 02/15/22    Discharge Disposition: Home or  Self Care    Physician of Record for Attribution - Please select from Treatment Team: HARSHA MARTINEZ [[id number]]    Review needed by CMO to determine Physician of Record: No       Question Answer Comment   Physician of Record for Attribution - Please select from Treatment Team HARSHA MARTINEZ    Review needed by CMO to determine Physician of Record No        02/15/22 1257

## 2022-02-22 ENCOUNTER — TELEPHONE (OUTPATIENT)
Dept: LACTATION | Facility: HOSPITAL | Age: 27
End: 2022-02-22

## 2022-02-22 NOTE — TELEPHONE ENCOUNTER
Lactation follow up call made. Mother states breastfeeding is going well. No questions or concerns. Pediatrician has ordered supplements with no lactation referral. Mother has engorged breasts so I encouraged her to pump after BF and offer her milk in a bottle for the supplements. Infant is due for a weight check on Thursday. I encouraged her to call for a lactation appointment if supplements must be continued so we can work on getting the baby breastfeeding efficiently.

## 2022-03-02 ENCOUNTER — OFFICE VISIT (OUTPATIENT)
Dept: OBSTETRICS AND GYNECOLOGY | Facility: CLINIC | Age: 27
End: 2022-03-02

## 2022-03-02 VITALS — HEIGHT: 66 IN | BODY MASS INDEX: 34.7 KG/M2

## 2022-03-02 PROCEDURE — 99441 PR PHYS/QHP TELEPHONE EVALUATION 5-10 MIN: CPT | Performed by: STUDENT IN AN ORGANIZED HEALTH CARE EDUCATION/TRAINING PROGRAM

## 2022-03-02 NOTE — PROGRESS NOTES
"This patient was seen via a telemedicine visit utilizing real time audio services through telephone. The patient verbally consented to the use of telemedicine services and understood that she was able to decline and schedule a visit or discontinue a telemedicine visit at any time. The patient was located in [place] at home and I was located in [place]. The visit lasted for 5 minutes.    Postpartum visit      Jayashree Roberts is a 26 y.o.  s/p Vaginal, Spontaneous on 22 at 40+0 secondary to term labor who presents today for postpartum check.  The patient states she is doing well.  Patient denies postpartum depression.  Menstrual cycles have not resumed, still having light menstrual flow bleeding, has decreased since delivery.  Breastfeeding.  Working with lactation on latching/breastfeeding, pumping well without breast complaints in meantime. Desires likely POP or OCP for contraception but wants to wait for 6 weeks.  She has not resumed sexual intercourse.  Denies bowel or bladder issues.    Delivery 22  Female, 3530 g, 2nd degree laceration and bilateral periurethral lacerations, APGARs 8/9,  cc, baby Christa    PHYSICAL EXAM:    Ht 167.6 cm (66\")   LMP 05/10/2021 (Within Days)   BMI 34.70 kg/m²   Telemed visit  Postpartum Depression Screening Questionnaire: 2, no treatment indicated.    IMPRESSION/PLAN: Jayashree Roberts is a 26 y.o.  s/p Vaginal, Spontaneous on .  Doing well.    - Recovered nicely from her delivery  - Contraception: likely POP or OCP at 6 weeks, declines currently, breastfeeding and pumping so discussed risk of decreased milk supply with OCP  - RTC 3/30 for final PP visit  - Needs pp pap, last pap was 2019 in [place], recommend repeat PP      This document has been electronically signed by Lori Ambriz DO on 2022 16:35 CST     "

## 2022-03-30 ENCOUNTER — POSTPARTUM VISIT (OUTPATIENT)
Dept: OBSTETRICS AND GYNECOLOGY | Facility: CLINIC | Age: 27
End: 2022-03-30

## 2022-03-30 VITALS
HEIGHT: 66 IN | SYSTOLIC BLOOD PRESSURE: 108 MMHG | DIASTOLIC BLOOD PRESSURE: 80 MMHG | WEIGHT: 199.4 LBS | BODY MASS INDEX: 32.05 KG/M2

## 2022-03-30 DIAGNOSIS — Z12.4 CERVICAL CANCER SCREENING: Primary | ICD-10-CM

## 2022-03-30 PROCEDURE — 99212 OFFICE O/P EST SF 10 MIN: CPT | Performed by: STUDENT IN AN ORGANIZED HEALTH CARE EDUCATION/TRAINING PROGRAM

## 2022-04-06 LAB
LAB AP CASE REPORT: NORMAL
PATH INTERP SPEC-IMP: NORMAL

## 2022-04-06 RX ORDER — ACETAMINOPHEN AND CODEINE PHOSPHATE 120; 12 MG/5ML; MG/5ML
1 SOLUTION ORAL DAILY
Qty: 28 TABLET | Refills: 12 | Status: SHIPPED | OUTPATIENT
Start: 2022-04-06 | End: 2023-04-06

## 2022-09-19 NOTE — PROGRESS NOTES
CC: Prenatal visit    Jayashree Roberts is a 26 y.o.  at 35w1d.  Doing well.  No complaints.  Denies contractions, LOF, or VB.  Reports good FM.    /68   Wt 92.7 kg (204 lb 6.4 oz)   LMP 05/10/2021 (Within Days)   BMI 32.99 kg/m²   SVE: deferred  Fundal Height (cm): 35 cm  Fetal Heart Rate: 152     Problems (from 21 to present)     Problem Noted Resolved    Tetanus, diphtheria, and acellular pertussis (Tdap) vaccination declined 2021 by Shy Paris MD No    Influenza vaccination declined by patient 2021 by Shy Paris MD No    Marijuana use 10/29/2021 by Lori Ambriz DO No    Overview Signed 10/29/2021 11:04 PM by Lori Ambriz DO     +THC IOB, denies current use         Encounter for supervision of normal first pregnancy in third trimester 2021 by Shy Paris MD No    Overview Addendum 2021  1:23 PM by Shy Paris MD     A POS/ Rubella immune/ GBS @ 36wks  Dating: LMP  Tdap: declined 21  Flu: declined 21  Anatomy: wnl  1h Glucola: elevated; 3HR pass  Lab Results   Component Value Date    HGB 13.5 2021    HCT 39.4 2021     2021     Feeding Method: undecided, considering both  BC plan: ? [email or web address] [email or web address] info 10/27           Previous Version          A/P: Jayashree Roberts is a 26 y.o.  at 35w1d.  - RTC in 1 week(s)  - GBS testing/mgmt reviewed  - FKC reviewed.  PTL precautions given.  Encouraged to drink 3-4 glasses of ice water if she experiences contractions.  If contractions occur regularly (every 5-10 minutes or less) for 1 hour and do not resolve with drinking fluids and/or taking a warm bath, patient advised to come to L&D for evaluation.       Diagnosis Plan   1. 35 weeks gestation of pregnancy     2. Tetanus, diphtheria, and acellular pertussis (Tdap) vaccination declined     3. Influenza vaccination declined by patient     4. Marijuana use     5. Encounter for supervision of normal first  pregnancy in third trimester         Signature  Shy Paris MD  [facility] [address], [place] [address]  Office: [phone]      This document has been electronically signed by Shy Paris MD on January 11, 2022 10:40 CST         Olumiant Counseling: I discussed with the patient the risks of Olumiant therapy including but not limited to upper respiratory tract infections, shingles, cold sores, and nausea. Live vaccines should be avoided.  This medication has been linked to serious infections; higher rate of mortality; malignancy and lymphoproliferative disorders; major adverse cardiovascular events; thrombosis; gastrointestinal perforations; neutropenia; lymphopenia; anemia; liver enzyme elevations; and lipid elevations.

## 2023-04-18 ENCOUNTER — OFFICE VISIT (OUTPATIENT)
Dept: OBSTETRICS AND GYNECOLOGY | Facility: CLINIC | Age: 28
End: 2023-04-18
Payer: MEDICAID

## 2023-04-18 VITALS
HEIGHT: 66 IN | SYSTOLIC BLOOD PRESSURE: 100 MMHG | BODY MASS INDEX: 31.66 KG/M2 | WEIGHT: 197 LBS | DIASTOLIC BLOOD PRESSURE: 68 MMHG

## 2023-04-18 DIAGNOSIS — F41.9 ANXIETY: ICD-10-CM

## 2023-04-18 DIAGNOSIS — Z30.41 ENCOUNTER FOR BIRTH CONTROL PILLS MAINTENANCE: Primary | ICD-10-CM

## 2023-04-18 RX ORDER — DROSPIRENONE AND ETHINYL ESTRADIOL 0.02-3(28)
1 KIT ORAL DAILY
Qty: 28 TABLET | Refills: 12 | Status: SHIPPED | OUTPATIENT
Start: 2023-04-18 | End: 2024-04-17

## 2023-04-18 RX ORDER — HYDROXYZINE HYDROCHLORIDE 25 MG/1
25 TABLET, FILM COATED ORAL 3 TIMES DAILY PRN
Qty: 30 TABLET | Refills: 2 | Status: SHIPPED | OUTPATIENT
Start: 2023-04-18

## 2023-04-18 NOTE — PROGRESS NOTES
Subjective   Jayashree Roberts is a 27 y.o. Refill BC    History of Present Illness  LMP: 3/10/23  Pap: 3/30/22, nl  Bc: [place]     Patient presents today for a refill on her BC. She has been on it for over 1 years. No complaints or issues with it. She is no longer breastfeeding. She has taken Simran in the past and would like to switch back to it. Reports her anxiety has started to become worse, did not know if she should bring it up here or to someone else.   Denies migranes, DVT, blood disorder, strokes, heart attack or HTN.       Contraception  This is a chronic problem. The current episode started more than 1 year ago. Pertinent negatives include no abdominal pain, chest pain, chills, coughing, fatigue, fever, headaches, nausea, urinary symptoms, visual change or vomiting.       The following portions of the patient's history were reviewed and updated as appropriate: allergies, current medications, past family history, past medical history, past social history, past surgical history and problem list.    Review of Systems   Constitutional: Negative for appetite change, chills, fatigue and fever.   Respiratory: Negative for apnea, cough, choking, chest tightness, shortness of breath, wheezing and stridor.    Cardiovascular: Negative for chest pain, palpitations and leg swelling.   Gastrointestinal: Negative for abdominal pain, constipation, diarrhea, nausea and vomiting.   Genitourinary: Negative for amenorrhea, breast discharge, breast lump, breast pain, decreased libido, decreased urine volume, difficulty urinating, dyspareunia, dysuria, flank pain, frequency, genital sores, hematuria, menstrual problem, pelvic pain, pelvic pressure, urgency, urinary incontinence, vaginal bleeding, vaginal discharge and vaginal pain.   Psychiatric/Behavioral: The patient is nervous/anxious.        Objective   Physical Exam  Vitals reviewed.   Constitutional:       General: She is awake. She is not in acute distress.      Appearance: Normal appearance. She is well-developed and normal weight. She is not ill-appearing, toxic-appearing or diaphoretic.   Cardiovascular:      Rate and Rhythm: Normal rate and regular rhythm.      Pulses: Normal pulses.      Heart sounds: Normal heart sounds.   Pulmonary:      Effort: Pulmonary effort is normal.      Breath sounds: Normal breath sounds.   Abdominal:      General: Bowel sounds are normal.   Neurological:      Mental Status: She is alert and easily aroused.   Psychiatric:         Behavior: Behavior is cooperative.           Assessment & Plan   Diagnoses and all orders for this visit:    1. Encounter for birth control pills maintenance (Primary)  -     drospirenone-ethinyl estradiol (LIZABETH) 3-0.02 MG per tablet; Take 1 tablet by mouth Daily.  Dispense: 28 tablet; Refill: 12    2. Anxiety  -     hydrOXYzine (ATARAX) 25 MG tablet; Take 1 tablet by mouth 3 (Three) Times a Day As Needed for Anxiety.  Dispense: 30 tablet; Refill: 2    Pt opted to switch back to LIZABETH. Encouraged to continue to take the OCP daily, around the same time every day. If you miss a dose, take the missed dose as soon as you remember.   May take hydroxyzine as needed for anxiety. Recommend calling resident clinic to set up an appointment to establish care with a PCP, to further discuss anxiety issues.         This document has been electronically signed by MAUREEN Masters on April 18, 2023 10:56 CDT

## 2023-04-21 RX ORDER — ACETAMINOPHEN AND CODEINE PHOSPHATE 120; 12 MG/5ML; MG/5ML
SOLUTION ORAL
Qty: 84 TABLET | Refills: 0 | OUTPATIENT
Start: 2023-04-21

## 2023-05-01 RX ORDER — ACETAMINOPHEN AND CODEINE PHOSPHATE 120; 12 MG/5ML; MG/5ML
1 SOLUTION ORAL DAILY
Qty: 28 TABLET | Refills: 12 | Status: SHIPPED | OUTPATIENT
Start: 2023-05-01 | End: 2024-04-30